# Patient Record
Sex: FEMALE | Race: WHITE | Employment: OTHER | URBAN - METROPOLITAN AREA
[De-identification: names, ages, dates, MRNs, and addresses within clinical notes are randomized per-mention and may not be internally consistent; named-entity substitution may affect disease eponyms.]

---

## 2019-01-01 ENCOUNTER — TELEPHONE (OUTPATIENT)
Dept: CARDIOLOGY | Age: 69
End: 2019-01-01

## 2019-01-01 ENCOUNTER — APPOINTMENT (OUTPATIENT)
Dept: GENERAL RADIOLOGY | Age: 69
End: 2019-01-01
Payer: MEDICARE

## 2019-01-01 ENCOUNTER — HOSPITAL ENCOUNTER (EMERGENCY)
Age: 69
Discharge: ANOTHER HOSPITAL | End: 2019-12-31
Attending: EMERGENCY MEDICINE
Payer: MEDICARE

## 2019-01-01 ENCOUNTER — HOSPITAL ENCOUNTER (OUTPATIENT)
Dept: CARDIOLOGY | Age: 69
Discharge: HOME | End: 2019-04-11
Payer: MEDICARE

## 2019-01-01 ENCOUNTER — HOSPITAL ENCOUNTER (OUTPATIENT)
Dept: DIABETES SERVICES | Age: 69
Setting detail: THERAPIES SERIES
Discharge: HOME | End: 2019-05-16
Payer: MEDICARE

## 2019-01-01 ENCOUNTER — HOSPITAL ENCOUNTER (EMERGENCY)
Age: 69
Discharge: HOME | End: 2019-12-24
Payer: MEDICARE

## 2019-01-01 ENCOUNTER — HOSPITAL ENCOUNTER (OUTPATIENT)
Dept: DIABETES SERVICES | Age: 69
Setting detail: THERAPIES SERIES
Discharge: HOME | End: 2019-05-14
Payer: MEDICARE

## 2019-01-01 ENCOUNTER — HOSPITAL ENCOUNTER (OUTPATIENT)
Dept: CARDIOLOGY | Age: 69
Discharge: HOME | End: 2019-12-19
Payer: MEDICARE

## 2019-01-01 ENCOUNTER — HOSPITAL ENCOUNTER (OUTPATIENT)
Dept: DIABETES SERVICES | Age: 69
Setting detail: THERAPIES SERIES
Discharge: HOME | End: 2019-05-15
Payer: MEDICARE

## 2019-01-01 VITALS
DIASTOLIC BLOOD PRESSURE: 81 MMHG | RESPIRATION RATE: 14 BRPM | HEIGHT: 64 IN | BODY MASS INDEX: 26.46 KG/M2 | OXYGEN SATURATION: 96 % | HEART RATE: 80 BPM | WEIGHT: 155 LBS | SYSTOLIC BLOOD PRESSURE: 145 MMHG | TEMPERATURE: 98.1 F

## 2019-01-01 VITALS
SYSTOLIC BLOOD PRESSURE: 124 MMHG | HEART RATE: 85 BPM | HEIGHT: 64 IN | BODY MASS INDEX: 26.46 KG/M2 | WEIGHT: 155 LBS | DIASTOLIC BLOOD PRESSURE: 70 MMHG

## 2019-01-01 VITALS
HEART RATE: 80 BPM | RESPIRATION RATE: 18 BRPM | TEMPERATURE: 98 F | HEIGHT: 64 IN | WEIGHT: 150 LBS | BODY MASS INDEX: 25.61 KG/M2 | DIASTOLIC BLOOD PRESSURE: 72 MMHG | SYSTOLIC BLOOD PRESSURE: 147 MMHG | OXYGEN SATURATION: 100 %

## 2019-01-01 DIAGNOSIS — M25.551 RIGHT HIP PAIN: Primary | ICD-10-CM

## 2019-01-01 DIAGNOSIS — M77.9 BONE SPUR: ICD-10-CM

## 2019-01-01 DIAGNOSIS — I35.0 AORTIC VALVE STENOSIS, ETIOLOGY OF CARDIAC VALVE DISEASE UNSPECIFIED: ICD-10-CM

## 2019-01-01 DIAGNOSIS — R07.9 CHEST PAIN, UNSPECIFIED TYPE: Primary | ICD-10-CM

## 2019-01-01 LAB
ALBUMIN SERPL-MCNC: 4.4 G/DL (ref 3.5–5.2)
ALP BLD-CCNC: 96 U/L (ref 35–104)
ALT SERPL-CCNC: 26 U/L (ref 0–32)
ANION GAP SERPL CALCULATED.3IONS-SCNC: 20 MMOL/L (ref 7–16)
AST SERPL-CCNC: 16 U/L (ref 0–31)
BASOPHILS ABSOLUTE: 0.03 E9/L (ref 0–0.2)
BASOPHILS RELATIVE PERCENT: 0.2 % (ref 0–2)
BILIRUB SERPL-MCNC: 0.3 MG/DL (ref 0–1.2)
BUN BLDV-MCNC: 53 MG/DL (ref 8–23)
CALCIUM SERPL-MCNC: 10.3 MG/DL (ref 8.6–10.2)
CHLORIDE BLD-SCNC: 94 MMOL/L (ref 98–107)
CO2: 21 MMOL/L (ref 22–29)
CREAT SERPL-MCNC: 3.3 MG/DL (ref 0.5–1)
EKG ATRIAL RATE: 78 BPM
EKG P AXIS: 41 DEGREES
EKG P-R INTERVAL: 224 MS
EKG Q-T INTERVAL: 362 MS
EKG QRS DURATION: 88 MS
EKG QTC CALCULATION (BAZETT): 412 MS
EKG R AXIS: -12 DEGREES
EKG T AXIS: 54 DEGREES
EKG VENTRICULAR RATE: 78 BPM
EOSINOPHILS ABSOLUTE: 0.54 E9/L (ref 0.05–0.5)
EOSINOPHILS RELATIVE PERCENT: 3.9 % (ref 0–6)
GFR AFRICAN AMERICAN: 17
GFR NON-AFRICAN AMERICAN: 14 ML/MIN/1.73
GLUCOSE BLD-MCNC: 234 MG/DL (ref 74–99)
HCT VFR BLD CALC: 32.8 % (ref 34–48)
HEMOGLOBIN: 10.7 G/DL (ref 11.5–15.5)
IMMATURE GRANULOCYTES #: 0.06 E9/L
IMMATURE GRANULOCYTES %: 0.4 % (ref 0–5)
LACTIC ACID: 1.7 MMOL/L (ref 0.5–2.2)
LIPASE: 40 U/L (ref 13–60)
LV EF: 60 %
LVEF MODALITY: NORMAL
LYMPHOCYTES ABSOLUTE: 1.36 E9/L (ref 1.5–4)
LYMPHOCYTES RELATIVE PERCENT: 9.7 % (ref 20–42)
MAGNESIUM: 2 MG/DL (ref 1.6–2.6)
MCH RBC QN AUTO: 31.6 PG (ref 26–35)
MCHC RBC AUTO-ENTMCNC: 32.6 % (ref 32–34.5)
MCV RBC AUTO: 96.8 FL (ref 80–99.9)
MONOCYTES ABSOLUTE: 1.11 E9/L (ref 0.1–0.95)
MONOCYTES RELATIVE PERCENT: 7.9 % (ref 2–12)
NEUTROPHILS ABSOLUTE: 10.9 E9/L (ref 1.8–7.3)
NEUTROPHILS RELATIVE PERCENT: 77.9 % (ref 43–80)
PDW BLD-RTO: 13.2 FL (ref 11.5–15)
PLATELET # BLD: 419 E9/L (ref 130–450)
PMV BLD AUTO: 8.6 FL (ref 7–12)
POTASSIUM REFLEX MAGNESIUM: 3 MMOL/L (ref 3.5–5)
RBC # BLD: 3.39 E12/L (ref 3.5–5.5)
SODIUM BLD-SCNC: 135 MMOL/L (ref 132–146)
TOTAL PROTEIN: 7.8 G/DL (ref 6.4–8.3)
WBC # BLD: 14 E9/L (ref 4.5–11.5)

## 2019-01-01 PROCEDURE — 93005 ELECTROCARDIOGRAM TRACING: CPT | Performed by: EMERGENCY MEDICINE

## 2019-01-01 PROCEDURE — A9500 TC99M SESTAMIBI: HCPCS | Performed by: INTERNAL MEDICINE

## 2019-01-01 PROCEDURE — 96375 TX/PRO/DX INJ NEW DRUG ADDON: CPT

## 2019-01-01 PROCEDURE — 93017 CV STRESS TEST TRACING ONLY: CPT

## 2019-01-01 PROCEDURE — 2500000003 HC RX 250 WO HCPCS: Performed by: EMERGENCY MEDICINE

## 2019-01-01 PROCEDURE — 2580000003 HC RX 258: Performed by: INTERNAL MEDICINE

## 2019-01-01 PROCEDURE — 6370000000 HC RX 637 (ALT 250 FOR IP): Performed by: PHYSICIAN ASSISTANT

## 2019-01-01 PROCEDURE — 97804 MEDICAL NUTRITION GROUP: CPT

## 2019-01-01 PROCEDURE — 78452 HT MUSCLE IMAGE SPECT MULT: CPT

## 2019-01-01 PROCEDURE — 85025 COMPLETE CBC W/AUTO DIFF WBC: CPT

## 2019-01-01 PROCEDURE — 96366 THER/PROPH/DIAG IV INF ADDON: CPT

## 2019-01-01 PROCEDURE — G0109 DIAB MANAGE TRN IND/GROUP: HCPCS

## 2019-01-01 PROCEDURE — 80053 COMPREHEN METABOLIC PANEL: CPT

## 2019-01-01 PROCEDURE — 74022 RADEX COMPL AQT ABD SERIES: CPT

## 2019-01-01 PROCEDURE — 2580000003 HC RX 258: Performed by: EMERGENCY MEDICINE

## 2019-01-01 PROCEDURE — 93010 ELECTROCARDIOGRAM REPORT: CPT | Performed by: INTERNAL MEDICINE

## 2019-01-01 PROCEDURE — 3430000000 HC RX DIAGNOSTIC RADIOPHARMACEUTICAL: Performed by: INTERNAL MEDICINE

## 2019-01-01 PROCEDURE — 73502 X-RAY EXAM HIP UNI 2-3 VIEWS: CPT

## 2019-01-01 PROCEDURE — 6360000002 HC RX W HCPCS: Performed by: EMERGENCY MEDICINE

## 2019-01-01 PROCEDURE — 83735 ASSAY OF MAGNESIUM: CPT

## 2019-01-01 PROCEDURE — 96365 THER/PROPH/DIAG IV INF INIT: CPT

## 2019-01-01 PROCEDURE — 99283 EMERGENCY DEPT VISIT LOW MDM: CPT

## 2019-01-01 PROCEDURE — 83690 ASSAY OF LIPASE: CPT

## 2019-01-01 PROCEDURE — 93306 TTE W/DOPPLER COMPLETE: CPT | Performed by: PSYCHIATRY & NEUROLOGY

## 2019-01-01 PROCEDURE — 96361 HYDRATE IV INFUSION ADD-ON: CPT

## 2019-01-01 PROCEDURE — 83605 ASSAY OF LACTIC ACID: CPT

## 2019-01-01 PROCEDURE — 99285 EMERGENCY DEPT VISIT HI MDM: CPT

## 2019-01-01 PROCEDURE — 6370000000 HC RX 637 (ALT 250 FOR IP): Performed by: EMERGENCY MEDICINE

## 2019-01-01 RX ORDER — POTASSIUM CHLORIDE 7.45 MG/ML
10 INJECTION INTRAVENOUS ONCE
Status: COMPLETED | OUTPATIENT
Start: 2019-01-01 | End: 2019-01-01

## 2019-01-01 RX ORDER — ONDANSETRON 2 MG/ML
4 INJECTION INTRAMUSCULAR; INTRAVENOUS ONCE
Status: COMPLETED | OUTPATIENT
Start: 2019-01-01 | End: 2019-01-01

## 2019-01-01 RX ORDER — SODIUM CHLORIDE 9 MG/ML
INJECTION, SOLUTION INTRAVENOUS ONCE
Status: COMPLETED | OUTPATIENT
Start: 2019-01-01 | End: 2019-01-01

## 2019-01-01 RX ORDER — POTASSIUM CHLORIDE 20 MEQ/1
40 TABLET, EXTENDED RELEASE ORAL ONCE
Status: DISCONTINUED | OUTPATIENT
Start: 2019-01-01 | End: 2019-01-01

## 2019-01-01 RX ORDER — ACETAMINOPHEN 500 MG
1000 TABLET ORAL ONCE
Status: COMPLETED | OUTPATIENT
Start: 2019-01-01 | End: 2019-01-01

## 2019-01-01 RX ORDER — OMEPRAZOLE 20 MG/1
20 CAPSULE, DELAYED RELEASE ORAL EVERY 12 HOURS
Qty: 28 CAPSULE | Refills: 0 | Status: SHIPPED | OUTPATIENT
Start: 2019-01-01 | End: 2020-01-01

## 2019-01-01 RX ORDER — TRAMADOL HYDROCHLORIDE 50 MG/1
25 TABLET ORAL ONCE
Status: COMPLETED | OUTPATIENT
Start: 2019-01-01 | End: 2019-01-01

## 2019-01-01 RX ORDER — ONDANSETRON 4 MG/1
4 TABLET, ORALLY DISINTEGRATING ORAL EVERY 8 HOURS PRN
Qty: 24 TABLET | Refills: 0 | Status: SHIPPED | OUTPATIENT
Start: 2019-01-01

## 2019-01-01 RX ORDER — SODIUM CHLORIDE 0.9 % (FLUSH) 0.9 %
10 SYRINGE (ML) INJECTION PRN
Status: DISCONTINUED | OUTPATIENT
Start: 2019-01-01 | End: 2019-01-01 | Stop reason: HOSPADM

## 2019-01-01 RX ADMIN — FAMOTIDINE 20 MG: 10 INJECTION INTRAVENOUS at 17:33

## 2019-01-01 RX ADMIN — Medication 10 ML: at 08:42

## 2019-01-01 RX ADMIN — Medication 10.1 MILLICURIE: at 08:41

## 2019-01-01 RX ADMIN — POTASSIUM CHLORIDE 10 MEQ: 7.46 INJECTION, SOLUTION INTRAVENOUS at 18:18

## 2019-01-01 RX ADMIN — ONDANSETRON 4 MG: 2 INJECTION INTRAMUSCULAR; INTRAVENOUS at 17:33

## 2019-01-01 RX ADMIN — TRAMADOL HYDROCHLORIDE 25 MG: 50 TABLET, FILM COATED ORAL at 13:16

## 2019-01-01 RX ADMIN — POTASSIUM CHLORIDE 10 MEQ: 7.46 INJECTION, SOLUTION INTRAVENOUS at 19:17

## 2019-01-01 RX ADMIN — ACETAMINOPHEN 1000 MG: 500 TABLET ORAL at 19:17

## 2019-01-01 RX ADMIN — Medication 10 ML: at 10:16

## 2019-01-01 RX ADMIN — SODIUM CHLORIDE: 9 INJECTION, SOLUTION INTRAVENOUS at 17:32

## 2019-01-01 RX ADMIN — Medication 32.4 MILLICURIE: at 10:16

## 2019-01-01 SDOH — ECONOMIC STABILITY: FOOD INSECURITY: ADDITIONAL INFORMATION: NO

## 2019-01-01 ASSESSMENT — PATIENT HEALTH QUESTIONNAIRE - PHQ9
2. FEELING DOWN, DEPRESSED OR HOPELESS: 1
SUM OF ALL RESPONSES TO PHQ9 QUESTIONS 1 & 2: 1
1. LITTLE INTEREST OR PLEASURE IN DOING THINGS: 0
SUM OF ALL RESPONSES TO PHQ QUESTIONS 1-9: 5
SUM OF ALL RESPONSES TO PHQ QUESTIONS 1-9: 5

## 2019-01-01 ASSESSMENT — PAIN SCALES - GENERAL
PAINLEVEL_OUTOF10: 7

## 2019-01-01 ASSESSMENT — PAIN DESCRIPTION - FREQUENCY: FREQUENCY: CONTINUOUS

## 2019-01-01 ASSESSMENT — PAIN DESCRIPTION - ORIENTATION: ORIENTATION: RIGHT

## 2019-01-01 ASSESSMENT — PAIN - FUNCTIONAL ASSESSMENT: PAIN_FUNCTIONAL_ASSESSMENT: PREVENTS OR INTERFERES SOME ACTIVE ACTIVITIES AND ADLS

## 2019-01-01 ASSESSMENT — PAIN DESCRIPTION - LOCATION: LOCATION: HIP

## 2019-01-01 ASSESSMENT — PAIN DESCRIPTION - ONSET: ONSET: SUDDEN

## 2019-01-01 ASSESSMENT — PAIN DESCRIPTION - DESCRIPTORS: DESCRIPTORS: ACHING

## 2019-01-01 ASSESSMENT — PAIN DESCRIPTION - PAIN TYPE: TYPE: ACUTE PAIN

## 2019-01-03 ENCOUNTER — APPOINTMENT (OUTPATIENT)
Dept: GENERAL RADIOLOGY | Age: 69
End: 2019-01-03
Payer: COMMERCIAL

## 2019-01-03 ENCOUNTER — HOSPITAL ENCOUNTER (EMERGENCY)
Age: 69
Discharge: HOME | End: 2019-01-04
Attending: EMERGENCY MEDICINE
Payer: COMMERCIAL

## 2019-01-03 ENCOUNTER — APPOINTMENT (OUTPATIENT)
Dept: CT IMAGING | Age: 69
End: 2019-01-03
Payer: COMMERCIAL

## 2019-01-03 DIAGNOSIS — S90.32XA CONTUSION OF LEFT FOOT, INITIAL ENCOUNTER: Primary | ICD-10-CM

## 2019-01-03 DIAGNOSIS — M20.12 HALLUX VALGUS OF LEFT FOOT: ICD-10-CM

## 2019-01-03 LAB
EKG ATRIAL RATE: 95 BPM
EKG P AXIS: 39 DEGREES
EKG P-R INTERVAL: 190 MS
EKG Q-T INTERVAL: 320 MS
EKG QRS DURATION: 90 MS
EKG QTC CALCULATION (BAZETT): 402 MS
EKG T AXIS: 45 DEGREES
EKG VENTRICULAR RATE: 95 BPM

## 2019-01-03 PROCEDURE — 99284 EMERGENCY DEPT VISIT MOD MDM: CPT

## 2019-01-03 PROCEDURE — 2580000003 HC RX 258: Performed by: EMERGENCY MEDICINE

## 2019-01-03 PROCEDURE — 6370000000 HC RX 637 (ALT 250 FOR IP): Performed by: EMERGENCY MEDICINE

## 2019-01-03 PROCEDURE — 73630 X-RAY EXAM OF FOOT: CPT

## 2019-01-03 PROCEDURE — 87502 INFLUENZA DNA AMP PROBE: CPT

## 2019-01-03 RX ORDER — AMLODIPINE BESYLATE 10 MG/1
10 TABLET ORAL DAILY
COMMUNITY
End: 2019-03-27 | Stop reason: SDUPTHER

## 2019-01-03 RX ORDER — ACETAMINOPHEN 500 MG
1000 TABLET ORAL ONCE
Status: COMPLETED | OUTPATIENT
Start: 2019-01-03 | End: 2019-01-03

## 2019-01-03 RX ORDER — GLYBURIDE 5 MG/1
5 TABLET ORAL
COMMUNITY
End: 2019-03-26 | Stop reason: ALTCHOICE

## 2019-01-03 RX ORDER — LEVOCETIRIZINE DIHYDROCHLORIDE 5 MG/1
5 TABLET, FILM COATED ORAL NIGHTLY
COMMUNITY

## 2019-01-03 RX ORDER — CHLORTHALIDONE 25 MG/1
25 TABLET ORAL DAILY
COMMUNITY
End: 2019-01-01 | Stop reason: SDUPTHER

## 2019-01-03 RX ORDER — 0.9 % SODIUM CHLORIDE 0.9 %
500 INTRAVENOUS SOLUTION INTRAVENOUS ONCE
Status: COMPLETED | OUTPATIENT
Start: 2019-01-03 | End: 2019-01-04

## 2019-01-03 RX ORDER — PIOGLITAZONEHYDROCHLORIDE 30 MG/1
30 TABLET ORAL DAILY
COMMUNITY
End: 2019-03-26 | Stop reason: ALTCHOICE

## 2019-01-03 RX ORDER — LOSARTAN POTASSIUM 100 MG/1
100 TABLET ORAL DAILY
COMMUNITY
End: 2019-01-01 | Stop reason: SDUPTHER

## 2019-01-03 RX ADMIN — ACETAMINOPHEN 1000 MG: 500 TABLET ORAL at 23:45

## 2019-01-03 RX ADMIN — SODIUM CHLORIDE 500 ML: 9 INJECTION, SOLUTION INTRAVENOUS at 23:18

## 2019-01-03 ASSESSMENT — PAIN SCALES - GENERAL: PAINLEVEL_OUTOF10: 9

## 2019-01-03 ASSESSMENT — PAIN DESCRIPTION - PAIN TYPE: TYPE: ACUTE PAIN

## 2019-01-03 ASSESSMENT — PAIN DESCRIPTION - ONSET: ONSET: SUDDEN

## 2019-01-03 ASSESSMENT — PAIN DESCRIPTION - FREQUENCY: FREQUENCY: CONTINUOUS

## 2019-01-03 ASSESSMENT — PAIN DESCRIPTION - PROGRESSION: CLINICAL_PROGRESSION: GRADUALLY WORSENING

## 2019-01-03 ASSESSMENT — PAIN DESCRIPTION - LOCATION: LOCATION: FOOT

## 2019-01-04 VITALS
WEIGHT: 145 LBS | HEIGHT: 64 IN | RESPIRATION RATE: 14 BRPM | TEMPERATURE: 99.8 F | BODY MASS INDEX: 24.75 KG/M2 | OXYGEN SATURATION: 95 % | DIASTOLIC BLOOD PRESSURE: 70 MMHG | SYSTOLIC BLOOD PRESSURE: 116 MMHG | HEART RATE: 85 BPM

## 2019-01-04 LAB
INFLUENZA A BY PCR: NOT DETECTED
INFLUENZA B BY PCR: NOT DETECTED

## 2019-01-04 RX ORDER — TRAMADOL HYDROCHLORIDE 50 MG/1
50 TABLET ORAL EVERY 8 HOURS PRN
Qty: 15 TABLET | Refills: 0 | Status: SHIPPED | OUTPATIENT
Start: 2019-01-04 | End: 2019-01-07

## 2019-01-04 RX ORDER — ONDANSETRON 8 MG/1
8 TABLET, ORALLY DISINTEGRATING ORAL EVERY 8 HOURS PRN
Qty: 10 TABLET | Refills: 1 | Status: SHIPPED | OUTPATIENT
Start: 2019-01-04 | End: 2019-03-26

## 2019-03-26 PROBLEM — N18.4 CHRONIC RENAL FAILURE, STAGE 4 (SEVERE) (HCC): Status: ACTIVE | Noted: 2019-03-26

## 2019-03-26 PROBLEM — D63.1 ANEMIA DUE TO STAGE 4 CHRONIC KIDNEY DISEASE (HCC): Status: ACTIVE | Noted: 2019-03-26

## 2019-03-26 PROBLEM — Z78.9 STATIN INTOLERANCE: Status: ACTIVE | Noted: 2019-03-26

## 2019-03-26 PROBLEM — I10 ESSENTIAL HYPERTENSION: Status: ACTIVE | Noted: 2019-03-26

## 2019-03-26 PROBLEM — N18.4 ANEMIA DUE TO STAGE 4 CHRONIC KIDNEY DISEASE (HCC): Status: ACTIVE | Noted: 2019-03-26

## 2019-03-26 PROBLEM — E11.29 TYPE 2 DIABETES MELLITUS WITH KIDNEY COMPLICATION, WITHOUT LONG-TERM CURRENT USE OF INSULIN (HCC): Status: ACTIVE | Noted: 2019-03-26

## 2019-03-26 PROBLEM — H35.3130 BILATERAL NONEXUDATIVE AGE-RELATED MACULAR DEGENERATION: Status: ACTIVE | Noted: 2019-03-26

## 2019-03-28 ENCOUNTER — TELEPHONE (OUTPATIENT)
Dept: CARDIOLOGY | Age: 69
End: 2019-03-28

## 2019-05-14 NOTE — LETTER
Upon completion of these sessions the diabetes team made the following evaluation of your patients progress:  [x] Attended class alone  [] Attended classes accompanied by                family/friend/significant other  [x] Very attentive to teaching  [] Actively participated in class                      discussions   [x] Answered questions appropriately            when asked   [x] Seems able to apply class concepts to     daily lifestyle  [] Had difficulty relating class information      to daily lifestyle  [x] Seems motivated to do well [] Participated in [place] nutrition                  session  [] Able to identify proper food choices      and diet changes  [] Unable to identify proper food choices  [] Verbalizes an understanding of meal       plan  [] Expresses an intent to comply with                 meal plan  [] Refused to participate in  [place]                nutrition session  [] Worked out meal timing adjustment                 according to work/schedule/lifestyle     PHQ-9 Depression Screen Score:  5  0-4: Minimal Depression              5-9: Mild Depression    10-14: Moderate Depression  15-19:  Moderately Severe Depression  20-27: Severe Depression     COMMENTS:      PATIENT SELECTED GOAL:  I will walk 15 minutes a day - 7 days a week    DIABETES SELF-MANAGEMENT SUPPORT PLAN/REFERRALS (patient identified):  [] Contact prescription assistance program  [] Attend smoking cessation classes  [x] Diabetes Support Group  [] Attend a follow-up support session  [x] Diabetes [place] Subscription   [] Follow up with doctor regarding depression  [x] Start exercising at home  [] Talk with family and friends for support  [] See a financial counselor   [x] Call Diabetes Educator with questions  [] Podiatrist appointment   [] Ophthalmologist  [] Dental appointment   [] Use Helpful Program Resource             Guide  [] Websites: [email or web address]. com/[place]                   www.diabetes. org                    www.eatright. org                   www.[employer].com       There will be a follow-up in 3 months to evaluate A1C, carbohydrate recall, attainment of their chosen goal, and self identified support plan. Thank you for referring this patient to our program.  Please do not hesitate to call if you have any questions at [phone] [facility]) or [phone] ([address] [place] [address]).         Sincerely,    Diabetes Educators:     []  Gabriel Garcia RN CDE      [x]  Lashanda Dominguez RN BSN CDE     [x]  Tello Biggs RDN LD CDE          []  Jeff Manzano RDN LD CDE  []  Bisi Shepard MA RDN LD   []  Pauline Mcdowell, MS GENOVEVAN  LD           Diabetes

## 2019-05-16 NOTE — PROGRESS NOTES
Diabetes Self-Management Education Record    Participant Name: Maurizio Luevano  Referring Provider: Heriberto Stark MD  Assessment/Evaluation Ratings:  1=Needs Instruction   4=Demonstrates Understanding/Competency  2=Needs Review   NC=Not Covered    3=Comprehends Key Points  N/A=Not Applicable  Topics/Learning Objectives Pre-session Assess Date:  5/14/19 KMS Instr. Date Reinforce Date Post- session Eval Comments   Diabetes disease process & Treatment process: Define diabetes & prediabetes; Identify own type of diabetes; role of the pancreas; signs/symptoms; diagnostic criteria; prevention & treatment options; contributing factors. 2 5/14/19 KMS  4 Type 2 since about 1994, A1C 7.9%    Incorporating nutritional management into lifestyle: Describe effect of type, amount & timing of food on blood glucose; Describe basic meal planning techniques & current nutrition guidelines;Correctly read food labels & demonstrate CHO counting & portion control with personalized meal plan. Identify dining out strategies, & dietary sick day guidelines. 1       Incorporating physical activity into lifestyle:   Verbalize effect of exercise on blood glucose levels; benefits of regular exercise; safety considerations; contraindications; maintenance of activity. 2 5/14/19 KMS  4 Housework    Using medications safely:  Identify effects of diabetes medicines on blood glucose levels; List diabetes medication taken, action & side effects; appropriate injection sites; proper storage; supplies needed; proper technique; safe needle disposal guidelines. 2 5/14/19 KMS  5/15/19 KMS  4 Levemir 55 units p.m. Monitoring blood glucose, interpreting and using results:  Identify recommended & personal blood glucose targets; importance of testing; testing supplies; HgbA1C target levels; Factors affecting blood glucose;  Importance of logging blood glucose levels for pattern recognition; ketone testing; safe lancet disposal. 2 5/14/19 KMS  4 Monitors daily Prevention, detection & treatment of acute complications:  Identify symptoms of hyper & hypoglycemia, and prevention & treatment strategies. Describe sick day guidelines & indications for ketone testing & physician notification. Identify short term consequences of poor control. 2 5/14/19 KMS  4    Prevention, detection & treatment of chronic complications:  Define the natural course of diabetes & describe the relationship of blood glucose levels to long term complications of diabetes. Identify preventative measures & standards of care. 1 5/14/19 KMS  4    Developing strategies to address psychosocial issues:  Describe feelings about living with diabetes; Describe how stress, depression & anxiety affect blood glucose; Identify coping strategies; Identify support needed & support network available. 1 5/14/19 KMS  4 PHQ-9 Depression Screen Score: 5  []Physician notified of suicidal ideations   Developing strategies to promote health/change behavior: Identify 7 self-care behaviors; Personal health risk factors; Benefits, challenges & strategies for behavioral change; Individualized goal selection.  1    Goal:     Identified Barriers to learning/adherence to self management plan:    None    Instruction Method:  Lecture/Discussion, Power Point Presentation  and Handouts    Education Materials/Equipment Provided: Self-management manual and Meal Plan       Encounter Type Date Attended Start Time End Time Comments No Show Dates   Assessment 5/14/19 KMS 0900 0930   In person    Session 1 5/14/19 KMS 0930 1200      Session 2        Session 3         Session 4         Gestational Diabetes         Individual MNT        Meter Instrx        Insulin Instrx            Additional Comments:    DSMES Support Plan:  Follow-up plan/Date: August 2019   Contact Post Class Regarding:   Fasting Blood Sugar   HgbA1C   Weight   Hypertension/Follow-up with Physician   Self-Foot Exam Frequency   Monitoring Frequency   Exercise Routine   Goal Attainment  Post Education Referrals:      []WIC   []PAP   []Wound Care   []Social Service   [] [facility]   [][facility]   []Sleep Lab   []Other

## 2019-05-21 PROBLEM — I35.0 NONRHEUMATIC AORTIC VALVE STENOSIS: Status: ACTIVE | Noted: 2019-01-01

## 2019-12-31 NOTE — ED PROVIDER NOTES
E9/L    RBC 3.39 (L) 3.50 - 5.50 E12/L    Hemoglobin 10.7 (L) 11.5 - 15.5 g/dL    Hematocrit 32.8 (L) 34.0 - 48.0 %    MCV 96.8 80.0 - 99.9 fL    MCH 31.6 26.0 - 35.0 pg    MCHC 32.6 32.0 - 34.5 %    RDW 13.2 11.5 - 15.0 fL    Platelets 356 092 - 353 E9/L    MPV 8.6 7.0 - 12.0 fL    Neutrophils % 77.9 43.0 - 80.0 %    Immature Granulocytes % 0.4 0.0 - 5.0 %    Lymphocytes % 9.7 (L) 20.0 - 42.0 %    Monocytes % 7.9 2.0 - 12.0 %    Eosinophils % 3.9 0.0 - 6.0 %    Basophils % 0.2 0.0 - 2.0 %    Neutrophils Absolute 10.90 (H) 1.80 - 7.30 E9/L    Immature Granulocytes # 0.06 E9/L    Lymphocytes Absolute 1.36 (L) 1.50 - 4.00 E9/L    Monocytes Absolute 1.11 (H) 0.10 - 0.95 E9/L    Eosinophils Absolute 0.54 (H) 0.05 - 0.50 E9/L    Basophils Absolute 0.03 0.00 - 0.20 E9/L   Comprehensive Metabolic Panel w/ Reflex to MG   Result Value Ref Range    Sodium 135 132 - 146 mmol/L    Potassium reflex Magnesium 3.0 (L) 3.5 - 5.0 mmol/L    Chloride 94 (L) 98 - 107 mmol/L    CO2 21 (L) 22 - 29 mmol/L    Anion Gap 20 (H) 7 - 16 mmol/L    Glucose 234 (H) 74 - 99 mg/dL    BUN 53 (H) 8 - 23 mg/dL    CREATININE 3.3 (H) 0.5 - 1.0 mg/dL    GFR Non-African American 14 >=60 mL/min/1.73    GFR African American 17     Calcium 10.3 (H) 8.6 - 10.2 mg/dL    Total Protein 7.8 6.4 - 8.3 g/dL    Alb 4.4 3.5 - 5.2 g/dL    Total Bilirubin 0.3 0.0 - 1.2 mg/dL    Alkaline Phosphatase 96 35 - 104 U/L    ALT 26 0 - 32 U/L    AST 16 0 - 31 U/L   Lipase   Result Value Ref Range    Lipase 40 13 - 60 U/L   Lactic Acid, Plasma   Result Value Ref Range    Lactic Acid 1.7 0.5 - 2.2 mmol/L   Magnesium   Result Value Ref Range    Magnesium 2.0 1.6 - 2.6 mg/dL   EKG 12 Lead   Result Value Ref Range    Ventricular Rate 78 BPM    Atrial Rate 78 BPM    P-R Interval 224 ms    QRS Duration 88 ms    Q-T Interval 362 ms    QTc Calculation (Bazett) 412 ms    P Axis 41 degrees    R Axis -12 degrees    T Axis 54 degrees       RADIOLOGY:  Interpreted by Radiologist.  XR Acute [address] [employer] Dec 31, 2019   1811 D/w dr Jennifer Doll wants pt to call Thursday to see in followup later this week ok to go home w zofran/ppi    [VU]      ED Course User Index  [VU] Johnny Freeman, DO       Medical Decision Making:    Likely with gastroenteritis. She had nausea vomiting diarrhea after eating [employer]. Her symptoms are actually better and she has not had any vomiting or diarrhea since this morning. We gave her IV fluids she is able to eat and likely will go home. Work on her replete her electrolytes before she is discharged. Her creatinine is baseline per previous labs from [facility]. I talked with her family doctor who states he will see her later this week and she is to call on Thursday. Risks and benefits were discussed with patient for All medications dispensed and given in department as well prescriptions prescribed for home, . The patient elected to take the medicine. Pt instructed on warning signs and precautions for medication side effects. The patient was given warning signs for when to seek medical attention. Counseling: The emergency provider has spoken with the patient and discussed todays results, in addition to providing specific details for the plan of care and counseling regarding the diagnosis and prognosis. Questions are answered at this time and they are agreeable with the plan.      --------------------------------- IMPRESSION AND DISPOSITION ---------------------------------    IMPRESSION  1. Nausea and vomiting, intractability of vomiting not specified, unspecified vomiting type    2. Diarrhea, unspecified type    3. Hypokalemia    4. Chronic kidney disease, unspecified CKD stage        DISPOSITION  Disposition: Discharge to home  Patient condition is good      NOTE: This report was transcribed using voice recognition software.  Every effort was made to ensure accuracy; however, inadvertent computerized transcription errors may be present         Johnny Freeman DO  12/31/19 1834

## 2020-01-01 ENCOUNTER — APPOINTMENT (OUTPATIENT)
Dept: NUCLEAR MEDICINE | Age: 70
DRG: 392 | End: 2020-01-01
Payer: MEDICARE

## 2020-01-01 ENCOUNTER — APPOINTMENT (OUTPATIENT)
Dept: GENERAL RADIOLOGY | Age: 70
DRG: 392 | End: 2020-01-01
Payer: MEDICARE

## 2020-01-01 ENCOUNTER — APPOINTMENT (OUTPATIENT)
Dept: ULTRASOUND IMAGING | Age: 70
DRG: 392 | End: 2020-01-01
Payer: MEDICARE

## 2020-01-01 ENCOUNTER — APPOINTMENT (OUTPATIENT)
Dept: CT IMAGING | Age: 70
DRG: 392 | End: 2020-01-01
Payer: MEDICARE

## 2020-01-01 ENCOUNTER — HOSPITAL ENCOUNTER (INPATIENT)
Age: 70
LOS: 2 days | Discharge: HOME | DRG: 392 | End: 2020-01-12
Attending: EMERGENCY MEDICINE | Admitting: INTERNAL MEDICINE
Payer: MEDICARE

## 2020-01-01 ENCOUNTER — HOSPITAL ENCOUNTER (EMERGENCY)
Age: 70
Discharge: HOME | End: 2020-01-02
Attending: EMERGENCY MEDICINE
Payer: MEDICARE

## 2020-01-01 VITALS
TEMPERATURE: 97.5 F | BODY MASS INDEX: 26.34 KG/M2 | RESPIRATION RATE: 16 BRPM | DIASTOLIC BLOOD PRESSURE: 88 MMHG | OXYGEN SATURATION: 94 % | HEIGHT: 64 IN | HEART RATE: 70 BPM | WEIGHT: 154.3 LBS | SYSTOLIC BLOOD PRESSURE: 183 MMHG

## 2020-01-01 VITALS
BODY MASS INDEX: 25.61 KG/M2 | TEMPERATURE: 98.7 F | DIASTOLIC BLOOD PRESSURE: 76 MMHG | HEIGHT: 64 IN | RESPIRATION RATE: 16 BRPM | HEART RATE: 88 BPM | WEIGHT: 150 LBS | SYSTOLIC BLOOD PRESSURE: 156 MMHG | OXYGEN SATURATION: 94 %

## 2020-01-01 LAB
ADENOVIRUS BY PCR: NOT DETECTED
ALBUMIN SERPL-MCNC: 3.8 G/DL (ref 3.5–5.2)
ALBUMIN SERPL-MCNC: 4.4 G/DL (ref 3.5–5.2)
ALP BLD-CCNC: 69 U/L (ref 35–104)
ALP BLD-CCNC: 81 U/L (ref 35–104)
ALT SERPL-CCNC: 19 U/L (ref 0–32)
ALT SERPL-CCNC: 25 U/L (ref 0–32)
ANION GAP SERPL CALCULATED.3IONS-SCNC: 11 MMOL/L (ref 7–16)
ANION GAP SERPL CALCULATED.3IONS-SCNC: 12 MMOL/L (ref 7–16)
ANION GAP SERPL CALCULATED.3IONS-SCNC: 15 MMOL/L (ref 7–16)
ANION GAP SERPL CALCULATED.3IONS-SCNC: 17 MMOL/L (ref 7–16)
AST SERPL-CCNC: 13 U/L (ref 0–31)
AST SERPL-CCNC: 17 U/L (ref 0–31)
BASOPHILS ABSOLUTE: 0.02 E9/L (ref 0–0.2)
BASOPHILS ABSOLUTE: 0.02 E9/L (ref 0–0.2)
BASOPHILS ABSOLUTE: 0.03 E9/L (ref 0–0.2)
BASOPHILS ABSOLUTE: 0.08 E9/L (ref 0–0.2)
BASOPHILS RELATIVE PERCENT: 0.1 % (ref 0–2)
BASOPHILS RELATIVE PERCENT: 0.1 % (ref 0–2)
BASOPHILS RELATIVE PERCENT: 0.2 % (ref 0–2)
BASOPHILS RELATIVE PERCENT: 0.3 % (ref 0–2)
BILIRUB SERPL-MCNC: 0.2 MG/DL (ref 0–1.2)
BILIRUB SERPL-MCNC: 0.2 MG/DL (ref 0–1.2)
BLOOD CULTURE, ROUTINE: NORMAL
BORDETELLA PARAPERTUSSIS BY PCR: NOT DETECTED
BORDETELLA PERTUSSIS BY PCR: NOT DETECTED
BUN BLDV-MCNC: 44 MG/DL (ref 8–23)
BUN BLDV-MCNC: 51 MG/DL (ref 8–23)
BUN BLDV-MCNC: 61 MG/DL (ref 8–23)
BUN BLDV-MCNC: 65 MG/DL (ref 8–23)
C DIFF TOXIN/ANTIGEN: NORMAL
C-REACTIVE PROTEIN: 0.2 MG/DL (ref 0–0.4)
CALCIUM SERPL-MCNC: 8.6 MG/DL (ref 8.6–10.2)
CALCIUM SERPL-MCNC: 9.1 MG/DL (ref 8.6–10.2)
CALCIUM SERPL-MCNC: 9.8 MG/DL (ref 8.6–10.2)
CALCIUM SERPL-MCNC: 9.9 MG/DL (ref 8.6–10.2)
CALPROTECTIN: 732 UG/G
CHLAMYDOPHILIA PNEUMONIAE BY PCR: NOT DETECTED
CHLORIDE BLD-SCNC: 104 MMOL/L (ref 98–107)
CHLORIDE BLD-SCNC: 107 MMOL/L (ref 98–107)
CHLORIDE BLD-SCNC: 98 MMOL/L (ref 98–107)
CHLORIDE BLD-SCNC: 99 MMOL/L (ref 98–107)
CO2: 17 MMOL/L (ref 22–29)
CO2: 18 MMOL/L (ref 22–29)
CO2: 19 MMOL/L (ref 22–29)
CO2: 21 MMOL/L (ref 22–29)
CORONAVIRUS 229E BY PCR: NOT DETECTED
CORONAVIRUS HKU1 BY PCR: NOT DETECTED
CORONAVIRUS NL63 BY PCR: NOT DETECTED
CORONAVIRUS OC43 BY PCR: NOT DETECTED
CREAT SERPL-MCNC: 2.7 MG/DL (ref 0.5–1)
CREAT SERPL-MCNC: 2.8 MG/DL (ref 0.5–1)
CREAT SERPL-MCNC: 2.9 MG/DL (ref 0.5–1)
CREAT SERPL-MCNC: 3.1 MG/DL (ref 0.5–1)
CULTURE, BLOOD 2: NORMAL
CULTURE, STOOL: NORMAL
EKG ATRIAL RATE: 73 BPM
EKG P AXIS: 41 DEGREES
EKG P-R INTERVAL: 208 MS
EKG Q-T INTERVAL: 372 MS
EKG QRS DURATION: 80 MS
EKG QTC CALCULATION (BAZETT): 409 MS
EKG R AXIS: -8 DEGREES
EKG T AXIS: 33 DEGREES
EKG VENTRICULAR RATE: 73 BPM
EOSINOPHILS ABSOLUTE: 0.04 E9/L (ref 0.05–0.5)
EOSINOPHILS ABSOLUTE: 0.09 E9/L (ref 0.05–0.5)
EOSINOPHILS ABSOLUTE: 0.38 E9/L (ref 0.05–0.5)
EOSINOPHILS ABSOLUTE: 0.9 E9/L (ref 0.05–0.5)
EOSINOPHILS RELATIVE PERCENT: 0.1 % (ref 0–6)
EOSINOPHILS RELATIVE PERCENT: 0.5 % (ref 0–6)
EOSINOPHILS RELATIVE PERCENT: 2.8 % (ref 0–6)
EOSINOPHILS RELATIVE PERCENT: 8.4 % (ref 0–6)
FECAL NEUTRAL FAT: NORMAL
FECAL SPLIT FATS: NORMAL
GFR AFRICAN AMERICAN: 18
GFR AFRICAN AMERICAN: 19
GFR AFRICAN AMERICAN: 20
GFR AFRICAN AMERICAN: 21
GFR NON-AFRICAN AMERICAN: 15 ML/MIN/1.73
GFR NON-AFRICAN AMERICAN: 16 ML/MIN/1.73
GFR NON-AFRICAN AMERICAN: 17 ML/MIN/1.73
GFR NON-AFRICAN AMERICAN: 17 ML/MIN/1.73
GIARDIA ANTIGEN STOOL: NORMAL
GLUCOSE BLD-MCNC: 114 MG/DL (ref 74–99)
GLUCOSE BLD-MCNC: 155 MG/DL (ref 74–99)
GLUCOSE BLD-MCNC: 226 MG/DL (ref 74–99)
GLUCOSE BLD-MCNC: 243 MG/DL (ref 74–99)
HCT VFR BLD CALC: 29.7 % (ref 34–48)
HCT VFR BLD CALC: 30.5 % (ref 34–48)
HCT VFR BLD CALC: 31 % (ref 34–48)
HCT VFR BLD CALC: 36.3 % (ref 34–48)
HEMOGLOBIN: 10 G/DL (ref 11.5–15.5)
HEMOGLOBIN: 10.1 G/DL (ref 11.5–15.5)
HEMOGLOBIN: 11.5 G/DL (ref 11.5–15.5)
HEMOGLOBIN: 9.4 G/DL (ref 11.5–15.5)
HUMAN METAPNEUMOVIRUS BY PCR: NOT DETECTED
HUMAN RHINOVIRUS/ENTEROVIRUS BY PCR: NOT DETECTED
IMMATURE GRANULOCYTES #: 0.06 E9/L
IMMATURE GRANULOCYTES #: 0.08 E9/L
IMMATURE GRANULOCYTES #: 0.1 E9/L
IMMATURE GRANULOCYTES #: 0.59 E9/L
IMMATURE GRANULOCYTES %: 0.5 % (ref 0–5)
IMMATURE GRANULOCYTES %: 0.6 % (ref 0–5)
IMMATURE GRANULOCYTES %: 0.6 % (ref 0–5)
IMMATURE GRANULOCYTES %: 1.4 % (ref 0–5)
INFLUENZA A BY PCR: NOT DETECTED
INFLUENZA A BY PCR: NOT DETECTED
INFLUENZA B BY PCR: NOT DETECTED
INFLUENZA B BY PCR: NOT DETECTED
LACTIC ACID: 1.8 MMOL/L (ref 0.5–2.2)
LIPASE: 50 U/L (ref 13–60)
LYMPHOCYTES ABSOLUTE: 1.1 E9/L (ref 1.5–4)
LYMPHOCYTES ABSOLUTE: 1.12 E9/L (ref 1.5–4)
LYMPHOCYTES ABSOLUTE: 1.91 E9/L (ref 1.5–4)
LYMPHOCYTES ABSOLUTE: 1.92 E9/L (ref 1.5–4)
LYMPHOCYTES RELATIVE PERCENT: 17.8 % (ref 20–42)
LYMPHOCYTES RELATIVE PERCENT: 2.6 % (ref 20–42)
LYMPHOCYTES RELATIVE PERCENT: 8.3 % (ref 20–42)
LYMPHOCYTES RELATIVE PERCENT: 9.6 % (ref 20–42)
MAGNESIUM: 1.8 MG/DL (ref 1.6–2.6)
MAGNESIUM: 2.4 MG/DL (ref 1.6–2.6)
MCH RBC QN AUTO: 31.6 PG (ref 26–35)
MCH RBC QN AUTO: 31.8 PG (ref 26–35)
MCH RBC QN AUTO: 32.1 PG (ref 26–35)
MCH RBC QN AUTO: 32.6 PG (ref 26–35)
MCHC RBC AUTO-ENTMCNC: 31.6 % (ref 32–34.5)
MCHC RBC AUTO-ENTMCNC: 31.7 % (ref 32–34.5)
MCHC RBC AUTO-ENTMCNC: 32.3 % (ref 32–34.5)
MCHC RBC AUTO-ENTMCNC: 33.1 % (ref 32–34.5)
MCV RBC AUTO: 100.3 FL (ref 80–99.9)
MCV RBC AUTO: 98.4 FL (ref 80–99.9)
MCV RBC AUTO: 99.4 FL (ref 80–99.9)
MCV RBC AUTO: 99.7 FL (ref 80–99.9)
METER GLUCOSE: 112 MG/DL (ref 74–99)
METER GLUCOSE: 116 MG/DL (ref 74–99)
METER GLUCOSE: 157 MG/DL (ref 74–99)
METER GLUCOSE: 181 MG/DL (ref 74–99)
METER GLUCOSE: 190 MG/DL (ref 74–99)
METER GLUCOSE: 207 MG/DL (ref 74–99)
METER GLUCOSE: 227 MG/DL (ref 74–99)
METER GLUCOSE: 81 MG/DL (ref 74–99)
MONOCYTES ABSOLUTE: 0.71 E9/L (ref 0.1–0.95)
MONOCYTES ABSOLUTE: 0.78 E9/L (ref 0.1–0.95)
MONOCYTES ABSOLUTE: 1.12 E9/L (ref 0.1–0.95)
MONOCYTES ABSOLUTE: 3.23 E9/L (ref 0.1–0.95)
MONOCYTES RELATIVE PERCENT: 3.9 % (ref 2–12)
MONOCYTES RELATIVE PERCENT: 6.6 % (ref 2–12)
MONOCYTES RELATIVE PERCENT: 7.7 % (ref 2–12)
MONOCYTES RELATIVE PERCENT: 8.3 % (ref 2–12)
MYCOPLASMA PNEUMONIAE BY PCR: NOT DETECTED
NEUTROPHILS ABSOLUTE: 10.76 E9/L (ref 1.8–7.3)
NEUTROPHILS ABSOLUTE: 17 E9/L (ref 1.8–7.3)
NEUTROPHILS ABSOLUTE: 37 E9/L (ref 1.8–7.3)
NEUTROPHILS ABSOLUTE: 7.14 E9/L (ref 1.8–7.3)
NEUTROPHILS RELATIVE PERCENT: 66.3 % (ref 43–80)
NEUTROPHILS RELATIVE PERCENT: 79.9 % (ref 43–80)
NEUTROPHILS RELATIVE PERCENT: 85.4 % (ref 43–80)
NEUTROPHILS RELATIVE PERCENT: 88 % (ref 43–80)
PARAINFLUENZA VIRUS 1 BY PCR: NOT DETECTED
PARAINFLUENZA VIRUS 2 BY PCR: NOT DETECTED
PARAINFLUENZA VIRUS 3 BY PCR: NOT DETECTED
PARAINFLUENZA VIRUS 4 BY PCR: NOT DETECTED
PDW BLD-RTO: 13.2 FL (ref 11.5–15)
PDW BLD-RTO: 13.3 FL (ref 11.5–15)
PDW BLD-RTO: 13.5 FL (ref 11.5–15)
PDW BLD-RTO: 13.7 FL (ref 11.5–15)
PLATELET # BLD: 406 E9/L (ref 130–450)
PLATELET # BLD: 437 E9/L (ref 130–450)
PLATELET # BLD: 467 E9/L (ref 130–450)
PLATELET # BLD: 610 E9/L (ref 130–450)
PMV BLD AUTO: 8.5 FL (ref 7–12)
PMV BLD AUTO: 8.8 FL (ref 7–12)
PMV BLD AUTO: 8.8 FL (ref 7–12)
PMV BLD AUTO: 9 FL (ref 7–12)
POTASSIUM REFLEX MAGNESIUM: 3.3 MMOL/L (ref 3.5–5)
POTASSIUM REFLEX MAGNESIUM: 3.3 MMOL/L (ref 3.5–5)
POTASSIUM REFLEX MAGNESIUM: 3.7 MMOL/L (ref 3.5–5)
POTASSIUM REFLEX MAGNESIUM: 3.8 MMOL/L (ref 3.5–5)
POTASSIUM SERPL-SCNC: 3.7 MMOL/L (ref 3.5–5)
POTASSIUM SERPL-SCNC: 3.8 MMOL/L (ref 3.5–5)
PROCALCITONIN: 0.19 NG/ML (ref 0–0.08)
RBC # BLD: 2.96 E12/L (ref 3.5–5.5)
RBC # BLD: 3.1 E12/L (ref 3.5–5.5)
RBC # BLD: 3.12 E12/L (ref 3.5–5.5)
RBC # BLD: 3.64 E12/L (ref 3.5–5.5)
RBC # BLD: NORMAL 10*6/UL
RESPIRATORY SYNCYTIAL VIRUS BY PCR: NOT DETECTED
ROTAVIRUS ANTIGEN: NORMAL
SEDIMENTATION RATE, ERYTHROCYTE: 20 MM/HR (ref 0–20)
SODIUM BLD-SCNC: 134 MMOL/L (ref 132–146)
SODIUM BLD-SCNC: 134 MMOL/L (ref 132–146)
SODIUM BLD-SCNC: 135 MMOL/L (ref 132–146)
SODIUM BLD-SCNC: 135 MMOL/L (ref 132–146)
TOTAL PROTEIN: 6.2 G/DL (ref 6.4–8.3)
TOTAL PROTEIN: 7.6 G/DL (ref 6.4–8.3)
TROPONIN: 0.04 NG/ML (ref 0–0.03)
WBC # BLD: 10.8 E9/L (ref 4.5–11.5)
WBC # BLD: 13.5 E9/L (ref 4.5–11.5)
WBC # BLD: 19.9 E9/L (ref 4.5–11.5)
WBC # BLD: 42 E9/L (ref 4.5–11.5)

## 2020-01-01 PROCEDURE — 80048 BASIC METABOLIC PNL TOTAL CA: CPT

## 2020-01-01 PROCEDURE — 2580000003 HC RX 258: Performed by: EMERGENCY MEDICINE

## 2020-01-01 PROCEDURE — 99284 EMERGENCY DEPT VISIT MOD MDM: CPT

## 2020-01-01 PROCEDURE — 85025 COMPLETE CBC W/AUTO DIFF WBC: CPT

## 2020-01-01 PROCEDURE — 6370000000 HC RX 637 (ALT 250 FOR IP): Performed by: EMERGENCY MEDICINE

## 2020-01-01 PROCEDURE — 2580000003 HC RX 258: Performed by: INTERNAL MEDICINE

## 2020-01-01 PROCEDURE — 96374 THER/PROPH/DIAG INJ IV PUSH: CPT

## 2020-01-01 PROCEDURE — 36415 COLL VENOUS BLD VENIPUNCTURE: CPT

## 2020-01-01 PROCEDURE — 96375 TX/PRO/DX INJ NEW DRUG ADDON: CPT

## 2020-01-01 PROCEDURE — C9113 INJ PANTOPRAZOLE SODIUM, VIA: HCPCS | Performed by: NURSE PRACTITIONER

## 2020-01-01 PROCEDURE — 87449 NOS EACH ORGANISM AG IA: CPT

## 2020-01-01 PROCEDURE — 96361 HYDRATE IV INFUSION ADD-ON: CPT

## 2020-01-01 PROCEDURE — 2580000003 HC RX 258: Performed by: NURSE PRACTITIONER

## 2020-01-01 PROCEDURE — 87502 INFLUENZA DNA AMP PROBE: CPT

## 2020-01-01 PROCEDURE — 1200000000 HC SEMI PRIVATE

## 2020-01-01 PROCEDURE — 6370000000 HC RX 637 (ALT 250 FOR IP): Performed by: NURSE PRACTITIONER

## 2020-01-01 PROCEDURE — 87329 GIARDIA AG IA: CPT

## 2020-01-01 PROCEDURE — 85651 RBC SED RATE NONAUTOMATED: CPT

## 2020-01-01 PROCEDURE — 6360000002 HC RX W HCPCS: Performed by: NURSE PRACTITIONER

## 2020-01-01 PROCEDURE — 82705 FATS/LIPIDS FECES QUAL: CPT

## 2020-01-01 PROCEDURE — 82962 GLUCOSE BLOOD TEST: CPT

## 2020-01-01 PROCEDURE — 80053 COMPREHEN METABOLIC PANEL: CPT

## 2020-01-01 PROCEDURE — 3430000000 HC RX DIAGNOSTIC RADIOPHARMACEUTICAL: Performed by: RADIOLOGY

## 2020-01-01 PROCEDURE — 6370000000 HC RX 637 (ALT 250 FOR IP): Performed by: INTERNAL MEDICINE

## 2020-01-01 PROCEDURE — 96372 THER/PROPH/DIAG INJ SC/IM: CPT

## 2020-01-01 PROCEDURE — A9541 TC99M SULFUR COLLOID: HCPCS | Performed by: RADIOLOGY

## 2020-01-01 PROCEDURE — 87040 BLOOD CULTURE FOR BACTERIA: CPT

## 2020-01-01 PROCEDURE — 83735 ASSAY OF MAGNESIUM: CPT

## 2020-01-01 PROCEDURE — 84484 ASSAY OF TROPONIN QUANT: CPT

## 2020-01-01 PROCEDURE — 76705 ECHO EXAM OF ABDOMEN: CPT

## 2020-01-01 PROCEDURE — 6360000002 HC RX W HCPCS: Performed by: EMERGENCY MEDICINE

## 2020-01-01 PROCEDURE — 84145 PROCALCITONIN (PCT): CPT

## 2020-01-01 PROCEDURE — 71046 X-RAY EXAM CHEST 2 VIEWS: CPT

## 2020-01-01 PROCEDURE — 93005 ELECTROCARDIOGRAM TRACING: CPT | Performed by: NURSE PRACTITIONER

## 2020-01-01 PROCEDURE — 96365 THER/PROPH/DIAG IV INF INIT: CPT

## 2020-01-01 PROCEDURE — 83690 ASSAY OF LIPASE: CPT

## 2020-01-01 PROCEDURE — 96376 TX/PRO/DX INJ SAME DRUG ADON: CPT

## 2020-01-01 PROCEDURE — 93010 ELECTROCARDIOGRAM REPORT: CPT | Performed by: INTERNAL MEDICINE

## 2020-01-01 PROCEDURE — 6360000002 HC RX W HCPCS: Performed by: INTERNAL MEDICINE

## 2020-01-01 PROCEDURE — 2500000003 HC RX 250 WO HCPCS: Performed by: EMERGENCY MEDICINE

## 2020-01-01 PROCEDURE — 83993 ASSAY FOR CALPROTECTIN FECAL: CPT

## 2020-01-01 PROCEDURE — 74018 RADEX ABDOMEN 1 VIEW: CPT

## 2020-01-01 PROCEDURE — 96367 TX/PROPH/DG ADDL SEQ IV INF: CPT

## 2020-01-01 PROCEDURE — 78264 GASTRIC EMPTYING IMG STUDY: CPT

## 2020-01-01 PROCEDURE — 99285 EMERGENCY DEPT VISIT HI MDM: CPT

## 2020-01-01 PROCEDURE — 86140 C-REACTIVE PROTEIN: CPT

## 2020-01-01 PROCEDURE — 87324 CLOSTRIDIUM AG IA: CPT

## 2020-01-01 PROCEDURE — 74176 CT ABD & PELVIS W/O CONTRAST: CPT

## 2020-01-01 PROCEDURE — 87045 FECES CULTURE AEROBIC BACT: CPT

## 2020-01-01 PROCEDURE — 87425 ROTAVIRUS AG IA: CPT

## 2020-01-01 PROCEDURE — 83605 ASSAY OF LACTIC ACID: CPT

## 2020-01-01 PROCEDURE — 0100U HC RESPIRPTHGN MULT REV TRANS & AMP PRB TECH 21 TRGT: CPT

## 2020-01-01 RX ORDER — POTASSIUM CHLORIDE 7.45 MG/ML
10 INJECTION INTRAVENOUS ONCE
Status: DISCONTINUED | OUTPATIENT
Start: 2020-01-01 | End: 2020-01-01 | Stop reason: HOSPADM

## 2020-01-01 RX ORDER — KETOROLAC TROMETHAMINE 30 MG/ML
15 INJECTION, SOLUTION INTRAMUSCULAR; INTRAVENOUS ONCE
Status: DISCONTINUED | OUTPATIENT
Start: 2020-01-01 | End: 2020-01-01 | Stop reason: HOSPADM

## 2020-01-01 RX ORDER — SODIUM CHLORIDE 9 MG/ML
INJECTION, SOLUTION INTRAVENOUS CONTINUOUS
Status: DISCONTINUED | OUTPATIENT
Start: 2020-01-01 | End: 2020-01-01 | Stop reason: HOSPADM

## 2020-01-01 RX ORDER — DIPHENOXYLATE HYDROCHLORIDE AND ATROPINE SULFATE 2.5; .025 MG/1; MG/1
1 TABLET ORAL 3 TIMES DAILY PRN
Qty: 10 TABLET | Refills: 0 | Status: SHIPPED | OUTPATIENT
Start: 2020-01-01 | End: 2020-01-01

## 2020-01-01 RX ORDER — ONDANSETRON 2 MG/ML
4 INJECTION INTRAMUSCULAR; INTRAVENOUS ONCE
Status: COMPLETED | OUTPATIENT
Start: 2020-01-01 | End: 2020-01-01

## 2020-01-01 RX ORDER — DEXTROSE MONOHYDRATE 25 G/50ML
12.5 INJECTION, SOLUTION INTRAVENOUS PRN
Status: DISCONTINUED | OUTPATIENT
Start: 2020-01-01 | End: 2020-01-01 | Stop reason: HOSPADM

## 2020-01-01 RX ORDER — SODIUM CHLORIDE 0.9 % (FLUSH) 0.9 %
10 SYRINGE (ML) INJECTION EVERY 12 HOURS SCHEDULED
Status: DISCONTINUED | OUTPATIENT
Start: 2020-01-01 | End: 2020-01-01 | Stop reason: HOSPADM

## 2020-01-01 RX ORDER — PANTOPRAZOLE SODIUM 40 MG/10ML
40 INJECTION, POWDER, LYOPHILIZED, FOR SOLUTION INTRAVENOUS 2 TIMES DAILY
Status: DISCONTINUED | OUTPATIENT
Start: 2020-01-01 | End: 2020-01-01 | Stop reason: HOSPADM

## 2020-01-01 RX ORDER — 0.9 % SODIUM CHLORIDE 0.9 %
1000 INTRAVENOUS SOLUTION INTRAVENOUS ONCE
Status: COMPLETED | OUTPATIENT
Start: 2020-01-01 | End: 2020-01-01

## 2020-01-01 RX ORDER — DIPHENOXYLATE HYDROCHLORIDE AND ATROPINE SULFATE 2.5; .025 MG/1; MG/1
1 TABLET ORAL ONCE
Status: DISCONTINUED | OUTPATIENT
Start: 2020-01-01 | End: 2020-01-01 | Stop reason: HOSPADM

## 2020-01-01 RX ORDER — INSULIN GLARGINE 100 [IU]/ML
34 INJECTION, SOLUTION SUBCUTANEOUS 2 TIMES DAILY
Status: DISCONTINUED | OUTPATIENT
Start: 2020-01-01 | End: 2020-01-01 | Stop reason: CLARIF

## 2020-01-01 RX ORDER — SODIUM CHLORIDE 9 MG/ML
10 INJECTION INTRAVENOUS 2 TIMES DAILY
Status: DISCONTINUED | OUTPATIENT
Start: 2020-01-01 | End: 2020-01-01 | Stop reason: HOSPADM

## 2020-01-01 RX ORDER — TRAMADOL HYDROCHLORIDE 50 MG/1
50 TABLET ORAL EVERY 6 HOURS PRN
Status: DISCONTINUED | OUTPATIENT
Start: 2020-01-01 | End: 2020-01-01 | Stop reason: HOSPADM

## 2020-01-01 RX ORDER — ACETAMINOPHEN 325 MG/1
650 TABLET ORAL ONCE
Status: COMPLETED | OUTPATIENT
Start: 2020-01-01 | End: 2020-01-01

## 2020-01-01 RX ORDER — INSULIN GLARGINE 100 [IU]/ML
34 INJECTION, SOLUTION SUBCUTANEOUS 2 TIMES DAILY
Status: DISCONTINUED | OUTPATIENT
Start: 2020-01-01 | End: 2020-01-01 | Stop reason: HOSPADM

## 2020-01-01 RX ORDER — POTASSIUM CHLORIDE 600 MG/1
8 TABLET, FILM COATED, EXTENDED RELEASE ORAL DAILY
Status: DISCONTINUED | OUTPATIENT
Start: 2020-01-01 | End: 2020-01-01 | Stop reason: HOSPADM

## 2020-01-01 RX ORDER — ONDANSETRON 4 MG/1
4 TABLET, ORALLY DISINTEGRATING ORAL EVERY 8 HOURS PRN
Status: DISCONTINUED | OUTPATIENT
Start: 2020-01-01 | End: 2020-01-01 | Stop reason: HOSPADM

## 2020-01-01 RX ORDER — POTASSIUM CHLORIDE 20 MEQ/1
40 TABLET, EXTENDED RELEASE ORAL ONCE
Status: DISCONTINUED | OUTPATIENT
Start: 2020-01-01 | End: 2020-01-01

## 2020-01-01 RX ORDER — POLYETHYLENE GLYCOL 3350 17 G/17G
17 POWDER, FOR SOLUTION ORAL DAILY PRN
Qty: 527 G | Refills: 1 | Status: SHIPPED | OUTPATIENT
Start: 2020-01-01 | End: 2020-01-01 | Stop reason: CLARIF

## 2020-01-01 RX ORDER — LOSARTAN POTASSIUM 50 MG/1
100 TABLET ORAL DAILY
Status: DISCONTINUED | OUTPATIENT
Start: 2020-01-01 | End: 2020-01-01 | Stop reason: HOSPADM

## 2020-01-01 RX ORDER — ONDANSETRON 2 MG/ML
4 INJECTION INTRAMUSCULAR; INTRAVENOUS EVERY 6 HOURS PRN
Status: DISCONTINUED | OUTPATIENT
Start: 2020-01-01 | End: 2020-01-01 | Stop reason: HOSPADM

## 2020-01-01 RX ORDER — LACTOBACILLUS RHAMNOSUS GG 10B CELL
1 CAPSULE ORAL 2 TIMES DAILY
Qty: 40 CAPSULE | Refills: 0 | Status: SHIPPED | OUTPATIENT
Start: 2020-01-01

## 2020-01-01 RX ORDER — CHLORTHALIDONE 25 MG/1
25 TABLET ORAL DAILY
Status: DISCONTINUED | OUTPATIENT
Start: 2020-01-01 | End: 2020-01-01 | Stop reason: HOSPADM

## 2020-01-01 RX ORDER — DIPHENOXYLATE HYDROCHLORIDE AND ATROPINE SULFATE 2.5; .025 MG/1; MG/1
1 TABLET ORAL ONCE
Status: COMPLETED | OUTPATIENT
Start: 2020-01-01 | End: 2020-01-01

## 2020-01-01 RX ORDER — POLYETHYLENE GLYCOL 3350 17 G/17G
17 POWDER, FOR SOLUTION ORAL DAILY
Status: DISCONTINUED | OUTPATIENT
Start: 2020-01-01 | End: 2020-01-01 | Stop reason: HOSPADM

## 2020-01-01 RX ORDER — AZELASTINE 1 MG/ML
2 SPRAY, METERED NASAL 2 TIMES DAILY
Status: DISCONTINUED | OUTPATIENT
Start: 2020-01-01 | End: 2020-01-01 | Stop reason: CLARIF

## 2020-01-01 RX ORDER — SODIUM CHLORIDE 0.9 % (FLUSH) 0.9 %
10 SYRINGE (ML) INJECTION PRN
Status: DISCONTINUED | OUTPATIENT
Start: 2020-01-01 | End: 2020-01-01 | Stop reason: HOSPADM

## 2020-01-01 RX ORDER — 0.9 % SODIUM CHLORIDE 0.9 %
2000 INTRAVENOUS SOLUTION INTRAVENOUS ONCE
Status: COMPLETED | OUTPATIENT
Start: 2020-01-01 | End: 2020-01-01

## 2020-01-01 RX ORDER — POLYETHYLENE GLYCOL 3350 17 G/17G
17 POWDER, FOR SOLUTION ORAL DAILY PRN
Status: DISCONTINUED | OUTPATIENT
Start: 2020-01-01 | End: 2020-01-01

## 2020-01-01 RX ORDER — AMLODIPINE BESYLATE 5 MG/1
5 TABLET ORAL DAILY
Status: DISCONTINUED | OUTPATIENT
Start: 2020-01-01 | End: 2020-01-01 | Stop reason: HOSPADM

## 2020-01-01 RX ORDER — NICOTINE POLACRILEX 4 MG
15 LOZENGE BUCCAL PRN
Status: DISCONTINUED | OUTPATIENT
Start: 2020-01-01 | End: 2020-01-01 | Stop reason: HOSPADM

## 2020-01-01 RX ORDER — ACETAMINOPHEN 500 MG
500 TABLET ORAL EVERY 6 HOURS PRN
Status: DISCONTINUED | OUTPATIENT
Start: 2020-01-01 | End: 2020-01-01 | Stop reason: HOSPADM

## 2020-01-01 RX ORDER — VITS A,C,E/LUTEIN/MINERALS 300MCG-200
1 TABLET ORAL DAILY
Status: DISCONTINUED | OUTPATIENT
Start: 2020-01-01 | End: 2020-01-01 | Stop reason: HOSPADM

## 2020-01-01 RX ORDER — DEXTROSE MONOHYDRATE 50 MG/ML
100 INJECTION, SOLUTION INTRAVENOUS PRN
Status: DISCONTINUED | OUTPATIENT
Start: 2020-01-01 | End: 2020-01-01 | Stop reason: HOSPADM

## 2020-01-01 RX ORDER — PANTOPRAZOLE SODIUM 40 MG/1
40 TABLET, DELAYED RELEASE ORAL
Status: DISCONTINUED | OUTPATIENT
Start: 2020-01-01 | End: 2020-01-01

## 2020-01-01 RX ORDER — LACTOBACILLUS RHAMNOSUS GG 10B CELL
1 CAPSULE ORAL 2 TIMES DAILY
Status: DISCONTINUED | OUTPATIENT
Start: 2020-01-01 | End: 2020-01-01 | Stop reason: HOSPADM

## 2020-01-01 RX ADMIN — INSULIN LISPRO 6 UNITS: 100 INJECTION, SOLUTION INTRAVENOUS; SUBCUTANEOUS at 16:53

## 2020-01-01 RX ADMIN — SODIUM CHLORIDE 2000 ML: 9 INJECTION, SOLUTION INTRAVENOUS at 20:26

## 2020-01-01 RX ADMIN — INSULIN GLARGINE 34 UNITS: 100 INJECTION, SOLUTION SUBCUTANEOUS at 08:26

## 2020-01-01 RX ADMIN — METRONIDAZOLE 500 MG: 500 INJECTION, SOLUTION INTRAVENOUS at 17:28

## 2020-01-01 RX ADMIN — Medication 1 MILLICURIE: at 09:15

## 2020-01-01 RX ADMIN — ACETAMINOPHEN 500 MG: 500 TABLET ORAL at 00:04

## 2020-01-01 RX ADMIN — DIPHENOXYLATE HYDROCHLORIDE AND ATROPINE SULFATE 1 TABLET: 2.5; .025 TABLET ORAL at 01:10

## 2020-01-01 RX ADMIN — PANTOPRAZOLE SODIUM 40 MG: 40 INJECTION, POWDER, FOR SOLUTION INTRAVENOUS at 22:18

## 2020-01-01 RX ADMIN — Medication 1 CAPSULE: at 22:17

## 2020-01-01 RX ADMIN — LOSARTAN POTASSIUM 100 MG: 50 TABLET, FILM COATED ORAL at 22:17

## 2020-01-01 RX ADMIN — POTASSIUM CHLORIDE 8 MEQ: 600 TABLET, FILM COATED, EXTENDED RELEASE ORAL at 22:29

## 2020-01-01 RX ADMIN — INSULIN LISPRO 6 UNITS: 100 INJECTION, SOLUTION INTRAVENOUS; SUBCUTANEOUS at 08:26

## 2020-01-01 RX ADMIN — ONDANSETRON 4 MG: 2 INJECTION INTRAMUSCULAR; INTRAVENOUS at 14:48

## 2020-01-01 RX ADMIN — ENOXAPARIN SODIUM 30 MG: 30 INJECTION SUBCUTANEOUS at 18:21

## 2020-01-01 RX ADMIN — Medication 10 ML: at 09:29

## 2020-01-01 RX ADMIN — Medication 10 ML: at 22:56

## 2020-01-01 RX ADMIN — PANTOPRAZOLE SODIUM 40 MG: 40 INJECTION, POWDER, FOR SOLUTION INTRAVENOUS at 22:55

## 2020-01-01 RX ADMIN — ONDANSETRON 4 MG: 2 INJECTION INTRAMUSCULAR; INTRAVENOUS at 20:26

## 2020-01-01 RX ADMIN — AMLODIPINE BESYLATE 5 MG: 5 TABLET ORAL at 18:21

## 2020-01-01 RX ADMIN — Medication 1 CAPSULE: at 14:57

## 2020-01-01 RX ADMIN — TRAMADOL HYDROCHLORIDE 50 MG: 50 TABLET, FILM COATED ORAL at 22:17

## 2020-01-01 RX ADMIN — ENOXAPARIN SODIUM 30 MG: 30 INJECTION SUBCUTANEOUS at 22:20

## 2020-01-01 RX ADMIN — SODIUM CHLORIDE: 9 INJECTION, SOLUTION INTRAVENOUS at 18:06

## 2020-01-01 RX ADMIN — SODIUM CHLORIDE: 9 INJECTION, SOLUTION INTRAVENOUS at 06:43

## 2020-01-01 RX ADMIN — PANTOPRAZOLE SODIUM 40 MG: 40 INJECTION, POWDER, FOR SOLUTION INTRAVENOUS at 09:29

## 2020-01-01 RX ADMIN — SODIUM CHLORIDE 1000 ML: 9 INJECTION, SOLUTION INTRAVENOUS at 14:48

## 2020-01-01 RX ADMIN — CHLORTHALIDONE 25 MG: 25 TABLET ORAL at 18:21

## 2020-01-01 RX ADMIN — Medication 1 TABLET: at 22:44

## 2020-01-01 RX ADMIN — ONDANSETRON 4 MG: 2 INJECTION INTRAMUSCULAR; INTRAVENOUS at 01:10

## 2020-01-01 RX ADMIN — Medication 10 ML: at 22:18

## 2020-01-01 RX ADMIN — SODIUM CHLORIDE: 9 INJECTION, SOLUTION INTRAVENOUS at 20:00

## 2020-01-01 RX ADMIN — Medication 10 ML: at 22:19

## 2020-01-01 RX ADMIN — INSULIN LISPRO 2 UNITS: 100 INJECTION, SOLUTION INTRAVENOUS; SUBCUTANEOUS at 22:26

## 2020-01-01 RX ADMIN — ACETAMINOPHEN 650 MG: 325 TABLET ORAL at 16:51

## 2020-01-01 RX ADMIN — INSULIN LISPRO 3 UNITS: 100 INJECTION, SOLUTION INTRAVENOUS; SUBCUTANEOUS at 12:52

## 2020-01-01 RX ADMIN — POTASSIUM BICARBONATE 20 MEQ: 782 TABLET, EFFERVESCENT ORAL at 22:45

## 2020-01-01 RX ADMIN — CEFTRIAXONE 2 G: 2 INJECTION, POWDER, FOR SOLUTION INTRAMUSCULAR; INTRAVENOUS at 16:23

## 2020-01-01 ASSESSMENT — PAIN DESCRIPTION - DESCRIPTORS
DESCRIPTORS: ACHING;DISCOMFORT;BURNING
DESCRIPTORS: BURNING
DESCRIPTORS: ACHING;BURNING;DISCOMFORT

## 2020-01-01 ASSESSMENT — PAIN DESCRIPTION - PAIN TYPE
TYPE: ACUTE PAIN

## 2020-01-01 ASSESSMENT — PAIN - FUNCTIONAL ASSESSMENT
PAIN_FUNCTIONAL_ASSESSMENT: PREVENTS OR INTERFERES SOME ACTIVE ACTIVITIES AND ADLS

## 2020-01-01 ASSESSMENT — PAIN DESCRIPTION - PROGRESSION
CLINICAL_PROGRESSION: NOT CHANGED
CLINICAL_PROGRESSION: GRADUALLY IMPROVING
CLINICAL_PROGRESSION: NOT CHANGED

## 2020-01-01 ASSESSMENT — PAIN DESCRIPTION - FREQUENCY
FREQUENCY: INTERMITTENT

## 2020-01-01 ASSESSMENT — PAIN SCALES - GENERAL
PAINLEVEL_OUTOF10: 3
PAINLEVEL_OUTOF10: 0
PAINLEVEL_OUTOF10: 0
PAINLEVEL_OUTOF10: 6
PAINLEVEL_OUTOF10: 0
PAINLEVEL_OUTOF10: 5
PAINLEVEL_OUTOF10: 0
PAINLEVEL_OUTOF10: 8

## 2020-01-01 ASSESSMENT — PAIN DESCRIPTION - ORIENTATION
ORIENTATION: RIGHT

## 2020-01-01 ASSESSMENT — PAIN DESCRIPTION - LOCATION
LOCATION: KNEE

## 2020-01-01 ASSESSMENT — PAIN DESCRIPTION - ONSET
ONSET: ON-GOING

## 2020-01-02 NOTE — ED PROVIDER NOTES
distress  Cardiovascular:  Regular rate and rhythm, no murmurs, gallops, or rubs. Abdomen: Stomach hyperactive, soft non-tender, no guarding rebound. Skin: warm and dry without rash  Neurologic: GCS 15,  Psych: Normal Affect      ------------------------------ ED COURSE/MEDICAL DECISION MAKING----------------------  Medications   ketorolac (TORADOL) injection 15 mg (15 mg Intravenous Not Given 1/1/20 2032)   diphenoxylate-atropine (LOMOTIL) 2.5-0.025 MG per tablet 1 tablet (1 tablet Oral Not Given 1/1/20 2033)   potassium chloride 10 mEq/100 mL IVPB (Peripheral Line) (has no administration in time range)   0.9 % sodium chloride bolus (2,000 mLs Intravenous New Bag 1/1/20 2026)   ondansetron (ZOFRAN) injection 4 mg (4 mg Intravenous Given 1/1/20 2026)       Medical Decision Making:    Pt was seen yesterday at ED for similar Sx with limited relief from medications. Suspected gastroenteritis. Above labs ordered to rule out electrolyte imbalance. Labs not significantly changed but kidney function improved and potassium improved patient is not actively vomiting and able to ambulate therefore does not appear to meet admission criteria    Counseling: The emergency provider has spoken with the patient and family member daughter and discussed todays results, in addition to providing specific details for the plan of care and counseling regarding the diagnosis and prognosis. Questions are answered at this time and they are agreeable with the plan.      --------------------------------- IMPRESSION AND DISPOSITION ---------------------------------    IMPRESSION  1. Gastroenteritis Stable       DISPOSITION  Disposition: Discharge to home  Patient condition is stable    1/1/20, 8:13 PM.    This note is prepared by Albino Crenshaw, acting as Scribe for Raman Lawler MD.    Raman Lawler MD:  The scribe's documentation has been prepared under my direction and personally reviewed by me in its entirety.   I confirm that the note above accurately reflects all work, treatment, procedures, and medical decision making performed by me.           Cosme Kelly MD  01/01/20 3843

## 2020-01-02 NOTE — ED NOTES
No cdiff sample provided;  aware; gave orders to provide pt with things to collect at home and take it to her PCP at follow up appointment; this nurse gave instructions, pt and family verbalized understanding about collection     Aleksandra Boyle RN  01/02/20 2737

## 2020-01-02 NOTE — ED NOTES
DC delayed per pt stated that she is still not feeling well;  to speak with pt; orders to be given     Garrison Smith, YASMEEN  01/02/20 4043

## 2020-01-10 PROBLEM — R11.2 NAUSEA AND VOMITING: Status: ACTIVE | Noted: 2020-01-01

## 2020-01-10 NOTE — ED PROVIDER NOTES
ED Attending  CC: No         Department of Emergency Medicine   ED  Provider Note  Admit Date/RoomTime: 1/10/2020  2:15 PM  ED Room: 23/23  Chief Complaint   Nausea (having symptoms since new years joan) and Emesis    History of Present Illness   Source of history provided by:  patient. History/Exam Limitations: none. Delisa Brito is a 68 y.o. old female with a past medical history of   Past Medical History:   Diagnosis Date    Diabetes mellitus ([address]     Ectopic pregnancy     Hyperlipidemia     Hypertension     Pilonidal cyst     presents to the emergency department ambulatory, with complaints of still present nausea and vomiting which began 10 day(s) prior to arrival.  There has been no similar episodes in the past. The symptoms are associated with anorexia. The symptoms are aggravated by eating and liquids and relieved by nothing. There has been no additional symptoms of abdominal pain, fever, chills or dysuria. ROS    Pertinent positives and negatives are stated within HPI, all other systems reviewed and are negative. Past Surgical History:   Procedure Laterality Date    ECHO COMPL W DOP COLOR FLOW  11/19/2012         HYSTERECTOMY      PILONIDAL CYST EXCISION     Social History:  reports that she has never smoked. She has never used smokeless tobacco. She reports that she does not drink alcohol or use drugs. Family History: family history includes Emphysema in her father; Ovarian Cancer (age of onset: 61) in her mother. Allergies: Calan [verapamil]; Levaquin [levofloxacin in d5w];  Pcn [penicillins]; and Tequin [gatifloxacin]    Physical Exam           ED Triage Vitals   BP Temp Temp Source Pulse Resp SpO2 Height Weight   01/10/20 1417 01/10/20 1417 01/10/20 1417 01/10/20 1417 01/10/20 1417 01/10/20 1419 01/10/20 1417 01/10/20 1417   (!) 140/67 98.5 °F (36.9 °C) Oral 102 14 95 % 5' 4\" (1.626 m) 150 lb (68 kg)      Oxygen Saturation Interpretation: Normal.    Constitutional:  Alert, 3.5 - 5.0 mmol/L    Chloride 99 98 - 107 mmol/L    CO2 19 (L) 22 - 29 mmol/L    Anion Gap 17 (H) 7 - 16 mmol/L    Glucose 114 (H) 74 - 99 mg/dL    BUN 65 (H) 8 - 23 mg/dL    CREATININE 2.9 (H) 0.5 - 1.0 mg/dL    GFR Non-African American 16 >=60 mL/min/1.73    GFR African American 19     Calcium 9.9 8.6 - 10.2 mg/dL    Total Protein 7.6 6.4 - 8.3 g/dL    Alb 4.4 3.5 - 5.2 g/dL    Total Bilirubin 0.2 0.0 - 1.2 mg/dL    Alkaline Phosphatase 81 35 - 104 U/L    ALT 25 0 - 32 U/L    AST 17 0 - 31 U/L   Lipase   Result Value Ref Range    Lipase 50 13 - 60 U/L   Lactic Acid, Plasma   Result Value Ref Range    Lactic Acid 1.8 0.5 - 2.2 mmol/L   Troponin   Result Value Ref Range    Troponin 0.04 (H) 0.00 - 0.03 ng/mL   Magnesium   Result Value Ref Range    Magnesium 2.4 1.6 - 2.6 mg/dL   EKG 12 Lead   Result Value Ref Range    Ventricular Rate 73 BPM    Atrial Rate 73 BPM    P-R Interval 208 ms    QRS Duration 80 ms    Q-T Interval 372 ms    QTc Calculation (Bazett) 409 ms    P Axis 41 degrees    R Axis -8 degrees    T Axis 33 degrees     Imaging: All Radiology results interpreted by Radiologist unless otherwise noted. CT ABDOMEN PELVIS WO CONTRAST Additional Contrast? None   Final Result      1. Chronic cholelithiasis without evidence for acute cholecystitis. 2. A 5 mm nonobstructing left renal calculus. 3. Chronic severe diverticulosis without evidence for acute   diverticulitis. 4. A new probable tiny cyst posterior right hepatic lobe. 5. Development of skin thickening and fibrotic changes along the left   lower quadrant anterior abdominal wall suggesting multiple   injections. .               XR CHEST STANDARD (2 VW)   Final Result   No acute process                 ED Course / Medical Decision Making     Medications   vancomycin (VANCOCIN) oral solution 250 mg (has no administration in time range)   metronidazole (FLAGYL) 500 mg in NaCl 100 mL IVPB premix (has no administration in time range)   0.9 % sodium chloride IV bolus 1,000 mL (0 mLs Intravenous Stopped 1/10/20 1617)   ondansetron (ZOFRAN) injection 4 mg (4 mg Intravenous Given 1/10/20 1448)   cefTRIAXone (ROCEPHIN) 2 g IVPB in D5W 50ml minibag (2 g Intravenous New Bag 1/10/20 1623)   acetaminophen (TYLENOL) tablet 650 mg (650 mg Oral Given 1/10/20 1651)          Consult(s):   none. Procedure(s):   none    MDM:   Patient was evaluated by Dr. Tenisha Caceres. She is well-appearing, nontoxic, in NAD. She reports persistent nausea over 10 days. She recently tested negative for C. difficile. WBC 42.0, platelets 241. Troponin 0.04, ECG normal sinus. She denies fever, chills, chest pain, abdominal pain, or dysuria. CT unremarkable. IV fluids and antibiotics started during the ED course. Consult to medicine who agreed to admit for further evaluation of nausea/vomiting and leukocytosis. She will be transferred to the floor in stable condition. Counseling: The emergency provider has spoken with the patient and discussed todays results, in addition to providing specific details for the plan of care and counseling regarding the diagnosis and prognosis. Questions are answered at this time and they are agreeable with the plan. Assessment     1. Nausea and vomiting, intractability of vomiting not specified, unspecified vomiting type    2. Leukocytosis, unspecified type      Plan   Admit to med/surg floor  Patient condition is stable    New Medications     New Prescriptions    No medications on file     Electronically signed by DAYO Palacios CNP   DD: 1/10/20  **This report was transcribed using voice recognition software. Every effort was made to ensure accuracy; however, inadvertent computerized transcription errors may be present.   END OF ED PROVIDER NOTE     DAYO Palacios CNP  01/10/20 [id number]

## 2020-01-10 NOTE — PROGRESS NOTES
Admission database completed. Careplan updated. Education provided to patient and visiting daughter regarding pain, fall risk precautions. Assigned nurse updated.

## 2020-01-11 NOTE — CONSULTS
Name:  Devin Bell  :  1950  MRN:  [id number]  Room:  [facility]3334-G  DOS:  1/10/2020    [address]  [facility]  Dr. Pavan Yepez M.D. Dr. Lincoln Villarreal M.D. KAMRAN Tejeda Dr., M.D. Dr. Kapil Miranda D.O.     -NP Consult Note-    PCP:  Jimbo Aburto MD  Admitting Physician:  Jimbo Aburto MD  Consultants:  GI  Chief Complaint:    Chief Complaint   Patient presents with    Nausea     having symptoms since new years joan    Emesis     Reason for Consult:  Colitis / nausea / vomiting / leukocytosis    History of Present Illness  Devin Bell is a 68 y.o. female on consult for colitis, nausea, vomiting, and leukocytosis. Patient indicates the symptoms were all preceded by hip pain which started 2019. Patient reports onset of nausea, vomiting, and diarrhea starting a couple of weeks ago. She reports the symptoms lasted for a few days, causing her to present to the emergency room. She reports that she was treated and discharged on 2 separate occasions. She reports that following her visit to the ER 2020, she was given medication for diarrhea. She indicates that she became constipated for 3 to 4 days, but then diarrhea resumed. She reports that she has been alternating between days of diarrhea, nausea, and vomiting and days of constipation and otherwise feeling fairly well. She reports that when vomiting, her emesis consists of yellow and brown liquid. She denies any hematemesis or emesis of coffee-ground material.  Diarrhea is described as brown and loose. She indicates there is a fair amount of mucus in her stools. She denies any blood or melena. She indicates that she had been feeling fairly well early this week, but symptoms returned earlier today. Patient denies any fever, chills, chest pain, shortness of breath. She indicates that her energy has been low over the past couple of weeks. She denies any ill contacts or dietary indiscretions. On prior hospital encounters, she was noted to have mild leukocytosis with WBC 14.0 and 13.5. Patient had an abdominal x-ray performed 12/31/2019 which was unremarkable. PMH: Hypertension, dyslipidemia, diabetes, CKD, diverticulosis, history of C. difficile about 10 years ago. Patient has a history of anemia and follows with the [facility] and receives Aranesp shots, currently once monthly. She has not had a shot in over a month. PSH: Surgery for ectopic pregnancy, D&C following miscarriage, hysterectomy, I&D of the cyst on tailbone. Patient reports colonoscopy about 10 years ago. She reports there were findings of diverticulosis, but no other acute findings. She has never had an EGD in the past.    Family history: Mother with uterine cancer. Father with emphysema. Maternal grandmother with CAD, CVA, and skin cancer. Maternal grandfather with CAD and CVA. Paternal aunt with CAD and diabetes. Paternal grandmother with colon cancer. She is unaware of any other personal or family history of GI issues or malignancy. Social history: Patient denies alcohol use. She was never a heavy drinker. She was never a smoker. She denies current or past recreational or illicit drug use. On arrival, WBC 42.0, hemoglobin 11.5, hematocrit 36.3. Normocytic/normochromic. Platelets 378. BUN 65, creatinine 2.9, sodium 135, potassium 3.3, chloride 99, CO2 19, calcium 9.9. Glucose 114. Mildly elevated troponin 0.04. CRP 0.2. Liver enzymes normal.  Outpatient stool studies showed negative C. difficile. Negative for influenza A and B.  CT on arrival showing gallbladder with dependent layer of sludge and stones. There was note of a small lobulated low-density structure along the posterior right upper hepatic lobe, new from prior CT. Diverticulosis without diverticulitis. New disc bulge at L2/3 with mild stenosis.     TRIAGE VITALS  BP: 137/64, Temp: 98.2 °F (36.8 °C), Pulse: 74, Resp: 16, SpO2: 96 %    Vitals:    01/10/20 1625 01/10/20 1715 01/10/20 1755 01/10/20 2013   BP: (!) 150/73 (!) 143/70 (!) 160/74 137/64   Pulse: 74 83 63 74   Resp:  14 14 16   Temp:  98.5 °F (36.9 °C) 98.6 °F (37 °C) 98.2 °F (36.8 °C)   TempSrc:  Oral Oral Oral   SpO2: 98% 97% 96% 96%   Weight:   148 lb (67.1 kg)    Height:   5' 4\" (1.626 m)          Histories  Past Medical History:   Diagnosis Date    Diabetes mellitus ([address]     Ectopic pregnancy     Hyperlipidemia     Hypertension     Pilonidal cyst      Past Surgical History:   Procedure Laterality Date    ECHO COMPL W DOP COLOR FLOW  11/19/2012         HYSTERECTOMY      PILONIDAL CYST EXCISION       Family History   Problem Relation Age of Onset    Ovarian Cancer Mother 61    Emphysema Father         previous smoker       Home Medications  Prior to Admission medications    Medication Sig Start Date End Date Taking? Authorizing Provider   POTASSIUM CHLORIDE PO Take 24 mEq by mouth 2 times daily   Yes Historical Provider, MD   ondansetron (ZOFRAN ODT) 4 MG disintegrating tablet Take 1 tablet by mouth every 8 hours as needed for Nausea or Vomiting 12/31/19  Yes Juanjose Mcdonald, DO   omeprazole (PRILOSEC) 20 MG delayed release capsule Take 1 capsule by mouth every 12 hours for 14 days 12/31/19 1/14/20 Yes Hubert Mcdonald, DO   acetaminophen (TYLENOL) 500 MG tablet Take 500 mg by mouth every 6 hours as needed for Pain   Yes Historical Provider, MD   rosuvastatin (CRESTOR) 5 MG tablet Half pill on mon, wed and friday 12/10/19  Yes Ruben Sandy MD   chlorthalidone (HYGROTON) 25 MG tablet Take 1 tablet by mouth daily 10/21/19  Yes Wing Mojica MD   insulin lispro (HUMALOG KWIKPEN) 100 UNIT/ML pen Inject 2-25 units as needed per sliding scale. Maximum daily dose is 25. 10/21/19  Yes Wing Mojica MD   blood glucose test strips (ONE TOUCH ULTRA TEST) strip 1 each by In Vitro route 3 times daily For use in One Touch Ultra device.  10/7/19  Yes Ruben Sandy MD Currently   Lifestyle    Physical activity:     Days per week: Not on file     Minutes per session: Not on file    Stress: Not on file   Relationships    Social connections:     Talks on phone: Not on file     Gets together: Not on file     Attends Mandaen service: Not on file     Active member of club or organization: Not on file     Attends meetings of clubs or organizations: Not on file     Relationship status: Not on file    Intimate partner violence:     Fear of current or ex partner: Not on file     Emotionally abused: Not on file     Physically abused: Not on file     Forced sexual activity: Not on file   Other Topics Concern    Not on file   Social History Narrative    Not on file       Review of Systems  All bolded are positive; please see HPI  General:  Fever, chills, diaphoresis, fatigue, malaise, night sweats, weight loss  Psychological:  Anxiety, disorientation, hallucinations. ENT:  Epistaxis, headaches, vertigo, visual changes. Cardiovascular:  Chest pain, irregular heartbeats, palpitations, paroxysmal nocturnal dyspnea. Respiratory:  Shortness of breath, coughing, sputum production, hemoptysis, wheezing, orthopnea.   Gastrointestinal:  Nausea, vomiting, diarrhea, heartburn, constipation, abdominal pain, hematemesis, hematochezia, melena, acholic stools  Genito-Urinary:  Dysuria, urgency, frequency, hematuria  Musculoskeletal:  Joint pain, joint stiffness, joint swelling, muscle pain  Neurology:  Headache, focal neurological deficits, weakness, numbness, paresthesia  Derm:  Rashes, ulcers, excoriations, bruising  Extremities:  Decreased ROM, peripheral edema, mottling    Physical Examination  Vitals:  /64   Pulse 74   Temp 98.2 °F (36.8 °C) (Oral)   Resp 16   Ht 5' 4\" (1.626 m)   Wt 148 lb (67.1 kg)   SpO2 96%   BMI 25.40 kg/m²   General Appearance:  awake, alert, and oriented to person, place, time, and purpose; appears stated age and cooperative; no apparent distress no labored breathing  HEENT:  NCAT; PERRL; conjunctiva pink, sclera clear  Neck:  no adenopathy, bruit, JVD, tenderness, masses, or nodules; supple, symmetrical, trachea midline, thyroid not enlarged  Lung:  clear to auscultation bilaterally; no use of accessory muscles; no rhonchi, rales, or crackles  Heart:  regular rate and regular rhythm without murmur, rub, or gallop  Abdomen:  soft, nontender, nondistended; normoactive bowel sounds; no organomegaly  Extremities:  extremities normal, atraumatic, no cyanosis or edema  Musculokeletal:  no joint swelling, no muscle tenderness. ROM normal in all joints of extremities.    Neurologic:  mental status A&Ox3, thought content appropriate; CN II-XII grossly intact; sensation intact, motor strength 5/5 globally; no slurred speech    Laboratory Data  Recent Results (from the past 24 hour(s))   Lactic Acid, Plasma    Collection Time: 01/10/20  2:44 PM   Result Value Ref Range    Lactic Acid 1.8 0.5 - 2.2 mmol/L   Rapid influenza A/B antigens    Collection Time: 01/10/20  2:45 PM   Result Value Ref Range    Influenza A by PCR Not Detected Not Detected    Influenza B by PCR Not Detected Not Detected   CBC Auto Differential    Collection Time: 01/10/20  2:46 PM   Result Value Ref Range    WBC 42.0 (H) 4.5 - 11.5 E9/L    RBC 3.64 3.50 - 5.50 E12/L    Hemoglobin 11.5 11.5 - 15.5 g/dL    Hematocrit 36.3 34.0 - 48.0 %    MCV 99.7 80.0 - 99.9 fL    MCH 31.6 26.0 - 35.0 pg    MCHC 31.7 (L) 32.0 - 34.5 %    RDW 13.5 11.5 - 15.0 fL    Platelets 166 (H) 429 - 450 E9/L    MPV 8.8 7.0 - 12.0 fL    Neutrophils % 88.0 (H) 43.0 - 80.0 %    Immature Granulocytes % 1.4 0.0 - 5.0 %    Lymphocytes % 2.6 (L) 20.0 - 42.0 %    Monocytes % 7.7 2.0 - 12.0 %    Eosinophils % 0.1 0.0 - 6.0 %    Basophils % 0.2 0.0 - 2.0 %    Neutrophils Absolute 37.00 (H) 1.80 - 7.30 E9/L    Immature Granulocytes # 0.59 E9/L    Lymphocytes Absolute 1.10 (L) 1.50 - 4.00 E9/L    Monocytes Absolute 3.23 (H) 0.10 - 0.95 E9/L None    Result Date: 1/10/2020  Patient MRN:  [id number] : 1950 Age: 68 years Gender: Female Order Date:  1/10/2020 4:00 PM EXAM: CT ABDOMEN PELVIS WO CONTRAST INDICATION:  nausea . Patient complains of having abdomen pain and nausea and vomiting since 2019. Nausea TECHNIQUE: Axial images from the lung bases to the pubic symphysis without any IV or oral contrast. Sagittal and coronal 2-D reconstruction. Axial lung windows. Dose reduction techniques were used. Contrast is none. Dose is total .16 MG Y CM. COMPARISON: CT abdomen and pelvis 2008, abdomen x-ray 2019. FINDINGS:  Lung bases: Minimal atelectasis at the posterior inferior right lung base and the lateral left lung base. No effusion. Heart size upper normal. Thoracic aorta is calcified and not dilated. No hiatal hernia. ABDOMEN: The gallbladder has average size with a dependent layer of several very tiny calcified gallstones and moderate sludge. A 2 mm nondependent stone along the anterior gallbladder margin, may be adherent. No obvious gallbladder wall thickening or any surrounding edema. The common bile duct measures 3 mm. The intrahepatic bile ducts are not dilated. There is a small lobulated low-density subcapsular structure along the posterior superior right hepatic lobe measuring approximately 6 mm x 4 mm x 5 mm. Possible cyst. It was not seen before. No other definite liver lesions. Liver length 18.3 cm. The left kidney is a new 5 mm nonobstructing lower pole calculus. No obvious calculi on the right. No ureter stone or hydronephrosis on either side. No obvious renal cyst. Negative noncontrast appearance of the spleen, pancreas. Aorta is moderately calcified below the renal arteries and tortuous to the left, no aneurysm. Both adrenal glands show mild nodularity, stable. Nonobstructive stomach and small bowel loops. Mild amount of retained food stuffs and fluid and moderate gas in the stomach.  Minimal gas scattered throughout the small bowel loops without any significant dilatation. Minimal retained fluid in distal small bowel loops. Pelvis: New inflammatory thickening of the left lower quadrant anterior abdominal wall and subcutaneous fat. Minimal new fibrotic changes on the right side as well. Suggesting multiple subcutaneous injections. Chronic severe diverticulosis of the descending and sigmoid colon. No acute diverticulitis, free fluid, free air or abscess. A similar appearance was seen previously. Average amount of residual stool in the colon. A few scattered proximal diverticula are present. A normal appendix in the right lower quadrant inferior and medial to the cecum. No ventral hernias. The urinary bladder is moderately distended at the time of exam with no stones or wall thickening. The distal ureters are not dilated. Several pelvic phleboliths are present without an obvious change. The uterus is absent. Ovaries are not seen and likely absent. Bones: No compression fracture. Development of the moderately large annular disc bulge at L2/3 with a mild stenosis. Moderate facet degeneration in the mid and lower lumbar spine. A moderate disc bulge and minimal anterolisthesis with moderate facet degeneration L3/4. Moderate facet degeneration without central stenosis L4/L5. 1. Chronic cholelithiasis without evidence for acute cholecystitis. 2. A 5 mm nonobstructing left renal calculus. 3. Chronic severe diverticulosis without evidence for acute diverticulitis. 4. A new probable tiny cyst posterior right hepatic lobe. 5. Development of skin thickening and fibrotic changes along the left lower quadrant anterior abdominal wall suggesting multiple injections. Heaven[facility] [place] Chest Standard (2 Vw)    Result Date: 1/10/2020  Patient MRN:  [id number] : 1950 Age: 68 years Gender: Female Order Date:  1/10/2020 2:45 PM EXAM: XR CHEST (2 VW) INDICATION:  tachycardia tachycardia COMPARISON: 2019 FINDINGS:  Heart size is normal. Some tortuosity of the aorta. There are no infiltrates or effusions. No acute process     Xr Hip Right (2-3 Views)    Result Date: 2019  Patient MRN:  [id number] : 1950 Age: 68 years Gender: Female Order Date:  2019 12:30 PM EXAM: XR HIP RIGHT (2-3 VIEWS) INDICATION:  fall, pain, r/o fx, r/o bursitis fall, pain, r/o fx, r/o bursitis COMPARISON: None FINDINGS:  There are no fractures or dislocations. Hip joint appears symmetric and unremarkable. There is a spur about the superolateral aspect of the acetabulum. No acute process     Xr Acute Abd Series Chest 1 Vw    Result Date: 2019  Patient MRN:  [id number] : 1950 Age: 68 years Gender: Female Order Date:  2019 5:15 PM EXAM: XR ACUTE ABD SERIES CHEST 1 VW NUMBER OF IMAGES:  4 views INDICATION:  diarrhea, n/v after kfc diarrhea, n/v after kfc COMPARISON: None FINDINGS: The lungs are clear. The heart is normal in size. The aorta is mildly tortuous. No pulmonary vascular congestion. No pneumothorax or pleural effusion. Evaluation of the bones reveals no fracture or destructive lesion. The bowel gas pattern is unremarkable. No evidence of bowel obstruction. Relatively mild fecal retention seen in the colon. Evaluation of the bones reveals no fracture or destructive lesion. No acute cardiopulmonary abnormality. Unremarkable bowel gas pattern. Assessment and Plan  Patient is a 68 y.o. female on consult for nausea, vomiting, and leukocytosis. 1.  Nausea / vomiting  -CT showing moderate amount of material and gas in the stomach  -No remark of small bowel obstruction  -Gastric emptying study pending  -Gallbladder ultrasound pending  -CT showing formed stool throughout the colon  -With recent history of alternating diarrhea and constipation, consider constipation as source of nausea and vomiting -see below  -PPI BID  -Zofran PRN  -Consider EGD    2.   Diarrhea / constipation  -Pending stool studies  -With alternating

## 2020-01-11 NOTE — PROGRESS NOTES
Detected Not Detected   CBC Auto Differential    Collection Time: 01/10/20  2:46 PM   Result Value Ref Range    WBC 42.0 (H) 4.5 - 11.5 E9/L    RBC 3.64 3.50 - 5.50 E12/L    Hemoglobin 11.5 11.5 - 15.5 g/dL    Hematocrit 36.3 34.0 - 48.0 %    MCV 99.7 80.0 - 99.9 fL    MCH 31.6 26.0 - 35.0 pg    MCHC 31.7 (L) 32.0 - 34.5 %    RDW 13.5 11.5 - 15.0 fL    Platelets 313 (H) 193 - 450 E9/L    MPV 8.8 7.0 - 12.0 fL    Neutrophils % 88.0 (H) 43.0 - 80.0 %    Immature Granulocytes % 1.4 0.0 - 5.0 %    Lymphocytes % 2.6 (L) 20.0 - 42.0 %    Monocytes % 7.7 2.0 - 12.0 %    Eosinophils % 0.1 0.0 - 6.0 %    Basophils % 0.2 0.0 - 2.0 %    Neutrophils Absolute 37.00 (H) 1.80 - 7.30 E9/L    Immature Granulocytes # 0.59 E9/L    Lymphocytes Absolute 1.10 (L) 1.50 - 4.00 E9/L    Monocytes Absolute 3.23 (H) 0.10 - 0.95 E9/L    Eosinophils Absolute 0.04 (L) 0.05 - 0.50 E9/L    Basophils Absolute 0.08 0.00 - 0.20 E9/L    RBC Morphology Normal    Comprehensive Metabolic Panel w/ Reflex to MG    Collection Time: 01/10/20  2:46 PM   Result Value Ref Range    Sodium 135 132 - 146 mmol/L    Potassium reflex Magnesium 3.3 (L) 3.5 - 5.0 mmol/L    Chloride 99 98 - 107 mmol/L    CO2 19 (L) 22 - 29 mmol/L    Anion Gap 17 (H) 7 - 16 mmol/L    Glucose 114 (H) 74 - 99 mg/dL    BUN 65 (H) 8 - 23 mg/dL    CREATININE 2.9 (H) 0.5 - 1.0 mg/dL    GFR Non-African American 16 >=60 mL/min/1.73    GFR African American 19     Calcium 9.9 8.6 - 10.2 mg/dL    Total Protein 7.6 6.4 - 8.3 g/dL    Alb 4.4 3.5 - 5.2 g/dL    Total Bilirubin 0.2 0.0 - 1.2 mg/dL    Alkaline Phosphatase 81 35 - 104 U/L    ALT 25 0 - 32 U/L    AST 17 0 - 31 U/L   Lipase    Collection Time: 01/10/20  2:46 PM   Result Value Ref Range    Lipase 50 13 - 60 U/L   Troponin    Collection Time: 01/10/20  2:46 PM   Result Value Ref Range    Troponin 0.04 (H) 0.00 - 0.03 ng/mL   Magnesium    Collection Time: 01/10/20  2:46 PM   Result Value Ref Range    Magnesium 2.4 1.6 - 2.6 mg/dL Sedimentation Rate    Collection Time: 01/10/20  2:46 PM   Result Value Ref Range    Sed Rate 20 0 - 20 mm/Hr   C-Reactive Protein    Collection Time: 01/10/20  2:46 PM   Result Value Ref Range    CRP 0.2 0.0 - 0.4 mg/dL   Procalcitonin    Collection Time: 01/10/20  2:46 PM   Result Value Ref Range    Procalcitonin 0.19 (H) 0.00 - 0.08 ng/mL   EKG 12 Lead    Collection Time: 01/10/20  3:44 PM   Result Value Ref Range    Ventricular Rate 73 BPM    Atrial Rate 73 BPM    P-R Interval 208 ms    QRS Duration 80 ms    Q-T Interval 372 ms    QTc Calculation (Bazett) 409 ms    P Axis 41 degrees    R Axis -8 degrees    T Axis 33 degrees   Culture stool #1    Collection Time: 01/10/20  4:47 PM   Result Value Ref Range    Culture, Stool Enteric Carmelina present; incubation continues    Rotavirus antigen, stool    Collection Time: 01/10/20  4:47 PM   Result Value Ref Range    Rotavirus       Rotavirus antigen test result: negative  *  *  Reference range: negative     POCT Glucose    Collection Time: 01/10/20  6:04 PM   Result Value Ref Range    Meter Glucose 116 (H) 74 - 99 mg/dL   POCT Glucose    Collection Time: 01/10/20 10:49 PM   Result Value Ref Range    Meter Glucose 112 (H) 74 - 99 mg/dL   Clostridium difficile EIA    Collection Time: 01/10/20 11:00 PM   Result Value Ref Range    C.diff Toxin/Antigen       C.  Difficile Toxin A and/or B NOT detected  Normal Range: Not detected     Basic Metabolic Panel w/ Reflex to MG    Collection Time: 01/11/20  2:55 AM   Result Value Ref Range    Sodium 134 132 - 146 mmol/L    Potassium reflex Magnesium 3.7 3.5 - 5.0 mmol/L    Chloride 104 98 - 107 mmol/L    CO2 18 (L) 22 - 29 mmol/L    Anion Gap 12 7 - 16 mmol/L    Glucose 155 (H) 74 - 99 mg/dL    BUN 61 (H) 8 - 23 mg/dL    CREATININE 2.7 (H) 0.5 - 1.0 mg/dL    GFR Non-African American 17 >=60 mL/min/1.73    GFR African American 21     Calcium 8.6 8.6 - 10.2 mg/dL   CBC auto differential    Collection Time: 01/11/20  2:55 AM   Result Value Ref Range    WBC 19.9 (H) 4.5 - 11.5 E9/L    RBC 3.12 (L) 3.50 - 5.50 E12/L    Hemoglobin 10.0 (L) 11.5 - 15.5 g/dL    Hematocrit 31.0 (L) 34.0 - 48.0 %    MCV 99.4 80.0 - 99.9 fL    MCH 32.1 26.0 - 35.0 pg    MCHC 32.3 32.0 - 34.5 %    RDW 13.7 11.5 - 15.0 fL    Platelets 501 (H) 631 - 450 E9/L    MPV 8.5 7.0 - 12.0 fL    Neutrophils % 85.4 (H) 43.0 - 80.0 %    Immature Granulocytes % 0.5 0.0 - 5.0 %    Lymphocytes % 9.6 (L) 20.0 - 42.0 %    Monocytes % 3.9 2.0 - 12.0 %    Eosinophils % 0.5 0.0 - 6.0 %    Basophils % 0.1 0.0 - 2.0 %    Neutrophils Absolute 17.00 (H) 1.80 - 7.30 E9/L    Immature Granulocytes # 0.10 E9/L    Lymphocytes Absolute 1.92 1.50 - 4.00 E9/L    Monocytes Absolute 0.78 0.10 - 0.95 E9/L    Eosinophils Absolute 0.09 0.05 - 0.50 E9/L    Basophils Absolute 0.02 0.00 - 0.20 E9/L   Comprehensive metabolic panel    Collection Time: 20  2:55 AM   Result Value Ref Range    Potassium 3.7 3.5 - 5.0 mmol/L    Total Protein 6.2 (L) 6.4 - 8.3 g/dL    Alb 3.8 3.5 - 5.2 g/dL    Total Bilirubin 0.2 0.0 - 1.2 mg/dL    Alkaline Phosphatase 69 35 - 104 U/L    ALT 19 0 - 32 U/L    AST 13 0 - 31 U/L   POCT Glucose    Collection Time: 20  6:45 AM   Result Value Ref Range    Meter Glucose 157 (H) 74 - 99 mg/dL   POCT Glucose    Collection Time: 20 12:19 PM   Result Value Ref Range    Meter Glucose 181 (H) 74 - 99 mg/dL       Imaging  Ct Abdomen Pelvis Wo Contrast Additional Contrast? None    Result Date: 1/10/2020  Patient MRN:  [id number] : 1950 Age: 68 years Gender: Female Order Date:  1/10/2020 4:00 PM EXAM: CT ABDOMEN PELVIS WO CONTRAST INDICATION:  nausea . Patient complains of having abdomen pain and nausea and vomiting since 2019. Nausea TECHNIQUE: Axial images from the lung bases to the pubic symphysis without any IV or oral contrast. Sagittal and coronal 2-D reconstruction. Axial lung windows. Dose reduction techniques were used. Contrast is none.  Dose is total DLP 634.16 MG Y CM. COMPARISON: CT abdomen and pelvis 4/12/2008, abdomen x-ray 12/31/2019. FINDINGS:  Lung bases: Minimal atelectasis at the posterior inferior right lung base and the lateral left lung base. No effusion. Heart size upper normal. Thoracic aorta is calcified and not dilated. No hiatal hernia. ABDOMEN: The gallbladder has average size with a dependent layer of several very tiny calcified gallstones and moderate sludge. A 2 mm nondependent stone along the anterior gallbladder margin, may be adherent. No obvious gallbladder wall thickening or any surrounding edema. The common bile duct measures 3 mm. The intrahepatic bile ducts are not dilated. There is a small lobulated low-density subcapsular structure along the posterior superior right hepatic lobe measuring approximately 6 mm x 4 mm x 5 mm. Possible cyst. It was not seen before. No other definite liver lesions. Liver length 18.3 cm. The left kidney is a new 5 mm nonobstructing lower pole calculus. No obvious calculi on the right. No ureter stone or hydronephrosis on either side. No obvious renal cyst. Negative noncontrast appearance of the spleen, pancreas. Aorta is moderately calcified below the renal arteries and tortuous to the left, no aneurysm. Both adrenal glands show mild nodularity, stable. Nonobstructive stomach and small bowel loops. Mild amount of retained food stuffs and fluid and moderate gas in the stomach. Minimal gas scattered throughout the small bowel loops without any significant dilatation. Minimal retained fluid in distal small bowel loops. Pelvis: New inflammatory thickening of the left lower quadrant anterior abdominal wall and subcutaneous fat. Minimal new fibrotic changes on the right side as well. Suggesting multiple subcutaneous injections. Chronic severe diverticulosis of the descending and sigmoid colon. No acute diverticulitis, free fluid, free air or abscess. A similar appearance was seen previously.  Average amount of admitting     5. Comorbidities  -HTN, HLD, CKD, DM  -Per admitting      Patient overall feeling better. Tolerating diet. No additional diarrhea. If bowel movements resume, labs stable, and tolerating diet, may be ok for discharge tomorrow from GI POV. Will follow as outpatient for scopes. Monitor labs. Will follow. DAYO Waddell - CNP  1:14 PM  1/11/2020    Agree with above. Pt doing better. Probable EGD as outpt. Leukomoid reaction per admitting, no obvious GI source.     Verne Favre, D.O.  1/11/20

## 2020-01-12 NOTE — PLAN OF CARE
Problem: Falls - Risk of:  Goal: Will remain free from falls  Description  Will remain free from falls  Outcome: Ongoing  Goal: Absence of physical injury  Description  Absence of physical injury  Outcome: Ongoing     Problem:  Bowel/Gastric:  Goal: Ability to achieve a regular elimination pattern will improve  Description  Ability to achieve a regular elimination pattern will improve  Outcome: Ongoing     Problem: Pain:  Goal: Pain level will decrease  Description  Pain level will decrease  Outcome: Ongoing  Goal: Control of acute pain  Description  Control of acute pain  Outcome: Ongoing  Goal: Control of chronic pain  Description  Control of chronic pain  Outcome: Ongoing

## 2020-01-12 NOTE — H&P
Department of Internal Medicine  Dr. Jeremiah Bartholomew History and Physical      CHIEF COMPLAINT:  Abdominal pain  Reason for Admission:  Colitis/leukocytosis    HISTORY OF PRESENT ILLNESS:      The patient is a 68 y.o. female with  who presents with the above problems. This patient has been having bowel problems since January 1. First she was having diarrhea. Then constipation. Then nausea vomiting and a mix of constipation and diarrhea. She's had several trips to the ER with the symptoms. She's been hypokalemia. I checked her stools for C. difficile colitis. That was negative. She does have DM on insulin. She may have some gatropathy. She also has chronic renal insufficiency. she has never seen blood in her stool but does see mucus. Past Medical History:        Diagnosis Date    Diabetes mellitus ([address]     Ectopic pregnancy     Hyperlipidemia     Hypertension     Pilonidal cyst      Past Surgical History:        Procedure Laterality Date    ECHO COMPL W DOP COLOR FLOW  11/19/2012         HYSTERECTOMY      PILONIDAL CYST EXCISION         Medications Prior to Admission:    Medications Prior to Admission: POTASSIUM CHLORIDE PO, Take 24 mEq by mouth 2 times daily  ondansetron (ZOFRAN ODT) 4 MG disintegrating tablet, Take 1 tablet by mouth every 8 hours as needed for Nausea or Vomiting  omeprazole (PRILOSEC) 20 MG delayed release capsule, Take 1 capsule by mouth every 12 hours for 14 days  acetaminophen (TYLENOL) 500 MG tablet, Take 500 mg by mouth every 6 hours as needed for Pain  rosuvastatin (CRESTOR) 5 MG tablet, Half pill on mon, wed and friday  chlorthalidone (HYGROTON) 25 MG tablet, Take 1 tablet by mouth daily  insulin lispro (HUMALOG KWIKPEN) 100 UNIT/ML pen, Inject 2-25 units as needed per sliding scale. Maximum daily dose is 25.  blood glucose test strips (ONE TOUCH ULTRA TEST) strip, 1 each by In Vitro route 3 times daily For use in One Touch Ultra device.   insulin detemir (LEVEMIR FLEXTOUCH) 100 UNIT/ML Ht 5' 4\" (1.626 m)   Wt 148 lb (67.1 kg)   SpO2 99%   BMI 25.40 kg/m²     CONSTITUTIONAL:  awake, alert, cooperative, no apparent distress, and appears stated age  EYES:  Lids and lashes normal, pupils equal, round and reactive to light, extra ocular muscles intact, sclera clear, conjunctiva normal  ENT:  Normocephalic, without obvious abnormality, atraumatic, sinuses nontender on palpation, external ears without lesions, oral pharynx with moist mucus membranes, tonsils without erythema or exudates, gums normal and good dentition. NECK:  Supple, symmetrical, trachea midline, no adenopathy, thyroid symmetric, not enlarged and no tenderness, skin normal  HEMATOLOGIC/LYMPHATICS:  no cervical lymphadenopathy  BACK:  Symmetric, no curvature, spinous processes are non-tender on palpation, paraspinous muscles are non-tender on palpation, no costal vertebral tenderness  LUNGS:  No increased work of breathing, good air exchange, clear to auscultation bilaterally, no crackles or wheezing  CARDIOVASCULAR:  Normal apical impulse, regular rate and rhythm, normal S1 and S2, no S3 or S4, and no murmur noted  ABDOMEN:  Soft, nontender. Normal bs    MUSCULOSKELETAL:  There is no redness, warmth, or swelling of the joints. Full range of motion noted. Motor strength is 5 out of 5 all extremities bilaterally. Tone is normal.  NEUROLOGIC:  Awake, alert, oriented to name, place and time. Cranial nerves II-XII are grossly intact. Motor is 5 out of 5 bilaterally. Cerebellar finger to nose, heel to shin intact. Sensory is intact.   Babinski down going, Romberg negative, and gait is normal.  SKIN:  no bruising or bleeding    DATA:  CBC:   Lab Results   Component Value Date    WBC 19.9 01/11/2020    RBC 3.12 01/11/2020    HGB 10.0 01/11/2020    HCT 31.0 01/11/2020    MCV 99.4 01/11/2020    MCH 32.1 01/11/2020    MCHC 32.3 01/11/2020    RDW 13.7 01/11/2020     01/11/2020    MPV 8.5 01/11/2020     CMP:    Lab Results Component Value Date     01/11/2020    K 3.7 01/11/2020    K 3.7 01/11/2020     01/11/2020    CO2 18 01/11/2020    BUN 61 01/11/2020    CREATININE 2.7 01/11/2020    GFRAA 21 01/11/2020    LABGLOM 17 01/11/2020    GLUCOSE 155 01/11/2020    PROT 6.2 01/11/2020    LABALBU 3.8 01/11/2020    CALCIUM 8.6 01/11/2020    BILITOT 0.2 01/11/2020    ALKPHOS 69 01/11/2020    AST 13 01/11/2020    ALT 19 01/11/2020     1. Chronic cholelithiasis without evidence for acute cholecystitis. 2. A 5 mm nonobstructing left renal calculus. 3. Chronic severe diverticulosis without evidence for acute   diverticulitis. 4. A new probable tiny cyst posterior right hepatic lobe.         5. Development of skin thickening and fibrotic changes along the left   lower quadrant anterior abdominal wall suggesting multiple   injections. .       ASSESSMENT AND PLAN:    Chronic cholelithiasis  colitis  probable liver cyst  probable irritable bowel versus diabetic gastropathy

## 2020-01-20 PROBLEM — E11.69 DIABETIC GASTROPATHY (HCC): Status: ACTIVE | Noted: 2020-01-01

## 2020-01-20 PROBLEM — M79.606 LEG PAIN: Status: ACTIVE | Noted: 2020-01-01

## 2020-01-20 PROBLEM — K31.9 DIABETIC GASTROPATHY (HCC): Status: ACTIVE | Noted: 2020-01-01
